# Patient Record
Sex: FEMALE | Race: WHITE | Employment: FULL TIME | URBAN - METROPOLITAN AREA
[De-identification: names, ages, dates, MRNs, and addresses within clinical notes are randomized per-mention and may not be internally consistent; named-entity substitution may affect disease eponyms.]

---

## 2018-05-17 ENCOUNTER — HOSPITAL ENCOUNTER (OUTPATIENT)
Age: 23
Discharge: HOME | End: 2018-05-19
Payer: COMMERCIAL

## 2018-05-17 PROCEDURE — 87070 CULTURE OTHR SPECIMN AEROBIC: CPT

## 2018-05-20 LAB — GENITAL CULTURE, ROUTINE: NORMAL

## 2018-07-19 ENCOUNTER — HOSPITAL ENCOUNTER (OUTPATIENT)
Age: 23
Discharge: HOME | End: 2018-07-21
Payer: COMMERCIAL

## 2018-07-19 DIAGNOSIS — N94.89 SWELLING OF LABIA: ICD-10-CM

## 2018-07-19 DIAGNOSIS — N89.8 VAGINAL DISCHARGE: ICD-10-CM

## 2018-07-19 DIAGNOSIS — N76.0 ACUTE VAGINITIS: ICD-10-CM

## 2018-07-19 DIAGNOSIS — N89.8 VAGINAL ODOR: ICD-10-CM

## 2018-07-19 DIAGNOSIS — R39.15 URGENCY OF URINATION: ICD-10-CM

## 2018-07-19 PROCEDURE — 87147 CULTURE TYPE IMMUNOLOGIC: CPT

## 2018-07-19 PROCEDURE — 87070 CULTURE OTHR SPECIMN AEROBIC: CPT

## 2018-07-23 LAB
GENITAL CULTURE, ROUTINE: ABNORMAL
GENITAL CULTURE, ROUTINE: ABNORMAL
ORGANISM: ABNORMAL
ORGANISM: ABNORMAL

## 2018-08-13 ENCOUNTER — HOSPITAL ENCOUNTER (EMERGENCY)
Age: 23
Discharge: HOME | End: 2018-08-13
Payer: COMMERCIAL

## 2018-08-13 VITALS
OXYGEN SATURATION: 98 % | BODY MASS INDEX: 41.65 KG/M2 | SYSTOLIC BLOOD PRESSURE: 136 MMHG | DIASTOLIC BLOOD PRESSURE: 76 MMHG | RESPIRATION RATE: 16 BRPM | WEIGHT: 250 LBS | HEIGHT: 65 IN | HEART RATE: 62 BPM | TEMPERATURE: 98.5 F

## 2018-08-13 DIAGNOSIS — B37.0 ORAL THRUSH: Primary | ICD-10-CM

## 2018-08-13 PROCEDURE — 99283 EMERGENCY DEPT VISIT LOW MDM: CPT

## 2018-08-13 ASSESSMENT — PAIN SCALES - GENERAL
PAINLEVEL_OUTOF10: 6
PAINLEVEL_OUTOF10: 6

## 2018-08-13 ASSESSMENT — PAIN DESCRIPTION - LOCATION
LOCATION: THROAT;MOUTH
LOCATION: MOUTH;THROAT

## 2018-08-13 ASSESSMENT — PAIN DESCRIPTION - PAIN TYPE
TYPE: ACUTE PAIN
TYPE: ACUTE PAIN

## 2018-08-13 ASSESSMENT — PAIN DESCRIPTION - DESCRIPTORS: DESCRIPTORS: CONSTANT

## 2018-08-13 NOTE — ED PROVIDER NOTES
[facility]       Department of Emergency Medicine   ED  Provider Note  Admit Date/RoomTime: 8/13/2018  2:26 PM  ED Room: 13/13  MRN: [id number]  Chief Complaint: Oral Swelling (for 2  days and throat feels full. chest tight at times with lifting)       History of Present Illness   Source of history provided by:  patient. History/Exam Limitations: none. Arabella Leung is a 24 y.o. female who has a past medical history of: No past medical history on file. presents to the ED by private car and is alone for white patches and discomfort to her tongue, beginning 2 days ago and are unchanged since that time. The complaint has been persistent, moderate in severity. She states she is also having painful swallowing. Patient states she just finished a course of flagyl for a bacterial vaginosis. She denies shortness of breath, nausea, or vomiting. She denies abdominal pain, vaginal discharge or bleeding. She denies dysuria or flank pain. ROS    Pertinent positives and negatives are stated within HPI, all other systems reviewed and are negative. No past surgical history on file. Social History:  reports that she has been smoking Cigarettes. She has never used smokeless tobacco. She reports that she does not drink alcohol or use drugs. Family History: family history is not on file. Allergies: Clindamycin/lincomycin    Physical Exam   Oxygen Saturation Interpretation: Normal.   ED Triage Vitals [08/13/18 1428]   BP Temp Temp src Pulse Resp SpO2 Height Weight   136/76 98.5 °F (36.9 °C) -- 62 16 98 % 5' 5\" (1.651 m) 250 lb (113.4 kg)       Physical Exam  · Constitutional/General: Alert and oriented x3, well appearing, non toxic  · HEENT:  NC/NT. PERRLA,  Airway patent. White patches noted to tongue. No erythema or white patches to throat.    · Neck: Supple, full ROM, non tender to palpation in the midline, no stridor, no crepitus, no meningeal signs  · Respiratory: Lungs clear to auscultation bilaterally, no wheezes, rales, or rhonchi. Not in respiratory distress  · CV:  Regular rate. Regular rhythm. No murmurs, gallops, or rubs. 2+ distal pulses  · Chest: No chest wall tenderness  · GI:  Abdomen Soft, Non tender, Non distended. +BS. No rebound, guarding, or rigidity. No pulsatile masses. · Musculoskeletal: Moves all extremities x 4. Warm and well perfused, no clubbing, cyanosis, or edema. Capillary refill <3 seconds  · Integument: skin warm and dry. No rashes. · Lymphatic: no lymphadenopathy noted  · Neurologic: GCS 15, no focal deficits  · Psychiatric: Normal Affect    Lab / Imaging Results   (All laboratory and radiology results have been personally reviewed by myself)  Labs:  No results found for this visit on 08/13/18. Imaging: All Radiology results interpreted by Radiologist unless otherwise noted. No orders to display       ED Course / Medical Decision Making   Medications - No data to display     Consult(s):   None    Procedure(s):   none    MDM:   Patient with white patches to tongue following antibiotic usage. No rash or other signs/symptoms of anaphylaxis. Patient discharged with nystatin and is instructed to contact primary care provider in 2-3 days. Patient to return sooner for new or worsening symptoms. Counseling: The emergency provider has spoken with the patient and discussed todays results, in addition to providing specific details for the plan of care and counseling regarding the diagnosis and prognosis. Questions are answered at this time and they are agreeable with the plan. Assessment      1. Oral thrush      Plan   Discharge to home  Patient condition is stable    New Medications     New Prescriptions    NYSTATIN (MYCOSTATIN) 885881 UNIT/ML SUSPENSION    Take 5 mLs by mouth 4 times daily Swish in mouth as long as possible before swallowing. Electronically signed by JO ANN Menon CNP   DD: 8/13/18  **This report was transcribed using voice recognition software. Every effort was made to ensure accuracy; however, inadvertent computerized transcription errors may be present.   END OF ED PROVIDER NOTE  ATTENDING PROVIDER ATTESTATION:     Supervising Physician, on-site, available for consultation, non-participatory in the evaluation or care of this patient     JO ANN Ly CNP  08/13/18 [id number] Michel Garretson, [place]  08/30/18 [id number]

## 2018-10-28 ENCOUNTER — HOSPITAL ENCOUNTER (EMERGENCY)
Age: 23
Discharge: HOME | End: 2018-10-28
Payer: COMMERCIAL

## 2018-10-28 VITALS
BODY MASS INDEX: 40.97 KG/M2 | DIASTOLIC BLOOD PRESSURE: 92 MMHG | OXYGEN SATURATION: 97 % | SYSTOLIC BLOOD PRESSURE: 156 MMHG | HEART RATE: 94 BPM | HEIGHT: 64 IN | WEIGHT: 240 LBS | TEMPERATURE: 98.9 F | RESPIRATION RATE: 17 BRPM

## 2018-10-28 DIAGNOSIS — J02.0 STREP PHARYNGITIS: Primary | ICD-10-CM

## 2018-10-28 LAB — STREP GRP A PCR: POSITIVE

## 2018-10-28 PROCEDURE — 99282 EMERGENCY DEPT VISIT SF MDM: CPT

## 2018-10-28 PROCEDURE — 87880 STREP A ASSAY W/OPTIC: CPT

## 2018-10-28 RX ORDER — CEFDINIR 300 MG/1
300 CAPSULE ORAL 2 TIMES DAILY
Qty: 20 CAPSULE | Refills: 0 | Status: SHIPPED | OUTPATIENT
Start: 2018-10-28 | End: 2018-11-07

## 2018-10-28 RX ORDER — NAPROXEN 500 MG/1
500 TABLET ORAL 2 TIMES DAILY
Qty: 30 TABLET | Refills: 0 | Status: SHIPPED | OUTPATIENT
Start: 2018-10-28 | End: 2019-03-23 | Stop reason: ALTCHOICE

## 2018-10-28 ASSESSMENT — PAIN DESCRIPTION - DESCRIPTORS: DESCRIPTORS: BURNING;SHARP

## 2018-10-28 ASSESSMENT — PAIN DESCRIPTION - LOCATION: LOCATION: THROAT

## 2018-10-28 ASSESSMENT — PAIN SCALES - GENERAL: PAINLEVEL_OUTOF10: 9

## 2018-10-28 ASSESSMENT — PAIN DESCRIPTION - FREQUENCY: FREQUENCY: CONTINUOUS

## 2018-10-28 ASSESSMENT — PAIN DESCRIPTION - PAIN TYPE: TYPE: ACUTE PAIN

## 2018-10-29 ENCOUNTER — HOSPITAL ENCOUNTER (OUTPATIENT)
Age: 23
Discharge: HOME | End: 2018-10-31
Payer: COMMERCIAL

## 2018-10-29 DIAGNOSIS — N94.10 DYSPAREUNIA, FEMALE: ICD-10-CM

## 2018-10-29 DIAGNOSIS — N89.8 VAGINAL IRRITATION: ICD-10-CM

## 2018-10-29 PROCEDURE — 87070 CULTURE OTHR SPECIMN AEROBIC: CPT

## 2018-10-29 PROCEDURE — 87147 CULTURE TYPE IMMUNOLOGIC: CPT

## 2018-10-29 PROCEDURE — 87210 SMEAR WET MOUNT SALINE/INK: CPT

## 2018-10-29 PROCEDURE — 87591 N.GONORRHOEAE DNA AMP PROB: CPT

## 2018-10-29 PROCEDURE — 87491 CHLMYD TRACH DNA AMP PROBE: CPT

## 2018-11-01 LAB
CHLAMYDIA TRACHOMATIS AMPLIFIED DET: NORMAL
N GONORRHOEAE AMPLIFIED DET: NORMAL

## 2018-11-02 LAB
GENITAL CULTURE, ROUTINE: ABNORMAL
GENITAL CULTURE, ROUTINE: ABNORMAL
ORGANISM: ABNORMAL

## 2018-11-03 LAB — CULTURE, TRICHOMONAS: NORMAL

## 2019-03-23 ENCOUNTER — HOSPITAL ENCOUNTER (EMERGENCY)
Age: 24
Discharge: HOME | End: 2019-03-23
Attending: EMERGENCY MEDICINE
Payer: COMMERCIAL

## 2019-03-23 VITALS
WEIGHT: 235 LBS | HEART RATE: 84 BPM | DIASTOLIC BLOOD PRESSURE: 88 MMHG | OXYGEN SATURATION: 98 % | TEMPERATURE: 98.3 F | SYSTOLIC BLOOD PRESSURE: 128 MMHG | RESPIRATION RATE: 16 BRPM | HEIGHT: 66 IN | BODY MASS INDEX: 37.77 KG/M2

## 2019-03-23 DIAGNOSIS — K52.9 GASTROENTERITIS: Primary | ICD-10-CM

## 2019-03-23 PROCEDURE — 96372 THER/PROPH/DIAG INJ SC/IM: CPT

## 2019-03-23 PROCEDURE — 6370000000 HC RX 637 (ALT 250 FOR IP): Performed by: EMERGENCY MEDICINE

## 2019-03-23 PROCEDURE — 6360000002 HC RX W HCPCS: Performed by: EMERGENCY MEDICINE

## 2019-03-23 PROCEDURE — 99283 EMERGENCY DEPT VISIT LOW MDM: CPT

## 2019-03-23 RX ORDER — PROMETHAZINE HYDROCHLORIDE 25 MG/ML
25 INJECTION, SOLUTION INTRAMUSCULAR; INTRAVENOUS ONCE
Status: COMPLETED | OUTPATIENT
Start: 2019-03-23 | End: 2019-03-23

## 2019-03-23 RX ORDER — ONDANSETRON 2 MG/ML
4 INJECTION INTRAMUSCULAR; INTRAVENOUS EVERY 6 HOURS PRN
Status: DISCONTINUED | OUTPATIENT
Start: 2019-03-23 | End: 2019-03-23 | Stop reason: HOSPADM

## 2019-03-23 RX ORDER — DIPHENOXYLATE HYDROCHLORIDE AND ATROPINE SULFATE 2.5; .025 MG/1; MG/1
1 TABLET ORAL ONCE
Status: COMPLETED | OUTPATIENT
Start: 2019-03-23 | End: 2019-03-23

## 2019-03-23 RX ORDER — ONDANSETRON 4 MG/1
4 TABLET, ORALLY DISINTEGRATING ORAL EVERY 8 HOURS PRN
Qty: 10 TABLET | Refills: 0 | Status: SHIPPED | OUTPATIENT
Start: 2019-03-23 | End: 2019-10-14 | Stop reason: ALTCHOICE

## 2019-03-23 RX ORDER — DIPHENOXYLATE HYDROCHLORIDE AND ATROPINE SULFATE 2.5; .025 MG/1; MG/1
1 TABLET ORAL 4 TIMES DAILY PRN
Qty: 20 TABLET | Refills: 0 | Status: SHIPPED | OUTPATIENT
Start: 2019-03-23 | End: 2019-03-28

## 2019-03-23 RX ADMIN — PROMETHAZINE HYDROCHLORIDE 25 MG: 25 INJECTION INTRAMUSCULAR; INTRAVENOUS at 06:23

## 2019-03-23 RX ADMIN — DIPHENOXYLATE HYDROCHLORIDE AND ATROPINE SULFATE 1 TABLET: 2.5; .025 TABLET ORAL at 06:23

## 2019-03-23 RX ADMIN — ONDANSETRON HYDROCHLORIDE 4 MG: 2 SOLUTION INTRAMUSCULAR; INTRAVENOUS at 06:22

## 2019-12-30 VITALS
OXYGEN SATURATION: 99 % | BODY MASS INDEX: 36.96 KG/M2 | HEART RATE: 83 BPM | SYSTOLIC BLOOD PRESSURE: 125 MMHG | TEMPERATURE: 98 F | WEIGHT: 230 LBS | RESPIRATION RATE: 16 BRPM | HEIGHT: 66 IN | DIASTOLIC BLOOD PRESSURE: 82 MMHG

## 2019-12-30 LAB
ALBUMIN SERPL-MCNC: 4.2 G/DL (ref 3.5–5.2)
ALP BLD-CCNC: 63 U/L (ref 35–104)
ALT SERPL-CCNC: 28 U/L (ref 0–32)
ANION GAP SERPL CALCULATED.3IONS-SCNC: 14 MMOL/L (ref 7–16)
AST SERPL-CCNC: 25 U/L (ref 0–31)
BACTERIA: ABNORMAL /HPF
BASOPHILS ABSOLUTE: 0.07 E9/L (ref 0–0.2)
BASOPHILS RELATIVE PERCENT: 0.5 % (ref 0–2)
BILIRUB SERPL-MCNC: 0.3 MG/DL (ref 0–1.2)
BILIRUBIN URINE: ABNORMAL
BLOOD, URINE: ABNORMAL
BUN BLDV-MCNC: 9 MG/DL (ref 6–20)
CALCIUM SERPL-MCNC: 9.5 MG/DL (ref 8.6–10.2)
CHLORIDE BLD-SCNC: 101 MMOL/L (ref 98–107)
CLARITY: ABNORMAL
CO2: 24 MMOL/L (ref 22–29)
COLOR: ABNORMAL
CREAT SERPL-MCNC: 0.8 MG/DL (ref 0.5–1)
EOSINOPHILS ABSOLUTE: 0.13 E9/L (ref 0.05–0.5)
EOSINOPHILS RELATIVE PERCENT: 0.9 % (ref 0–6)
EPITHELIAL CELLS, UA: ABNORMAL /HPF
GFR AFRICAN AMERICAN: >60
GFR NON-AFRICAN AMERICAN: >60 ML/MIN/1.73
GLUCOSE BLD-MCNC: 119 MG/DL (ref 74–99)
GLUCOSE URINE: NEGATIVE MG/DL
HCG, URINE, POC: NEGATIVE
HCT VFR BLD CALC: 42.6 % (ref 34–48)
HEMOGLOBIN: 13.7 G/DL (ref 11.5–15.5)
IMMATURE GRANULOCYTES #: 0.06 E9/L
IMMATURE GRANULOCYTES %: 0.4 % (ref 0–5)
KETONES, URINE: ABNORMAL MG/DL
LACTIC ACID, SEPSIS: 1.9 MMOL/L (ref 0.5–1.9)
LEUKOCYTE ESTERASE, URINE: NEGATIVE
LIPASE: 16 U/L (ref 13–60)
LYMPHOCYTES ABSOLUTE: 1.99 E9/L (ref 1.5–4)
LYMPHOCYTES RELATIVE PERCENT: 14 % (ref 20–42)
Lab: NORMAL
MCH RBC QN AUTO: 27.5 PG (ref 26–35)
MCHC RBC AUTO-ENTMCNC: 32.2 % (ref 32–34.5)
MCV RBC AUTO: 85.4 FL (ref 80–99.9)
MONOCYTES ABSOLUTE: 0.74 E9/L (ref 0.1–0.95)
MONOCYTES RELATIVE PERCENT: 5.2 % (ref 2–12)
NEGATIVE QC PASS/FAIL: NORMAL
NEUTROPHILS ABSOLUTE: 11.27 E9/L (ref 1.8–7.3)
NEUTROPHILS RELATIVE PERCENT: 79 % (ref 43–80)
NITRITE, URINE: POSITIVE
PDW BLD-RTO: 12.3 FL (ref 11.5–15)
PH UA: 5 (ref 5–9)
PLATELET # BLD: 278 E9/L (ref 130–450)
PMV BLD AUTO: 9.9 FL (ref 7–12)
POSITIVE QC PASS/FAIL: NORMAL
POTASSIUM SERPL-SCNC: 4.5 MMOL/L (ref 3.5–5)
PROTEIN UA: 100 MG/DL
RBC # BLD: 4.99 E12/L (ref 3.5–5.5)
RBC UA: >20 /HPF (ref 0–2)
SODIUM BLD-SCNC: 139 MMOL/L (ref 132–146)
SPECIFIC GRAVITY UA: >=1.03 (ref 1–1.03)
TOTAL PROTEIN: 8.1 G/DL (ref 6.4–8.3)
UROBILINOGEN, URINE: 1 E.U./DL
WBC # BLD: 14.3 E9/L (ref 4.5–11.5)
WBC UA: ABNORMAL /HPF (ref 0–5)

## 2019-12-30 PROCEDURE — 85025 COMPLETE CBC W/AUTO DIFF WBC: CPT

## 2019-12-30 PROCEDURE — 83605 ASSAY OF LACTIC ACID: CPT

## 2019-12-30 PROCEDURE — 81001 URINALYSIS AUTO W/SCOPE: CPT

## 2019-12-30 PROCEDURE — 83690 ASSAY OF LIPASE: CPT

## 2019-12-30 PROCEDURE — 80053 COMPREHEN METABOLIC PANEL: CPT

## 2019-12-30 PROCEDURE — 36415 COLL VENOUS BLD VENIPUNCTURE: CPT

## 2019-12-30 ASSESSMENT — PAIN DESCRIPTION - LOCATION: LOCATION: ABDOMEN;BACK

## 2019-12-30 ASSESSMENT — PAIN DESCRIPTION - PAIN TYPE: TYPE: ACUTE PAIN

## 2019-12-30 ASSESSMENT — PAIN DESCRIPTION - DESCRIPTORS: DESCRIPTORS: CRAMPING

## 2019-12-30 ASSESSMENT — PAIN DESCRIPTION - PROGRESSION: CLINICAL_PROGRESSION: NOT CHANGED

## 2019-12-30 ASSESSMENT — PAIN DESCRIPTION - FREQUENCY: FREQUENCY: INTERMITTENT

## 2019-12-31 ENCOUNTER — HOSPITAL ENCOUNTER (EMERGENCY)
Age: 24
Discharge: HOME | End: 2019-12-31

## 2019-12-31 PROCEDURE — 99283 EMERGENCY DEPT VISIT LOW MDM: CPT

## 2020-01-03 NOTE — ED NOTES
FIRST PROVIDER CONTACT ASSESSMENT NOTE      Department of Emergency Medicine   ED  First Provider Note   1/2/20  11:46 PM    Chief Complaint: Abdominal Pain (and flank pain past couple of hours. States she is on her period but this pain is 5x worse. Last BM saturday, with difficulty. Denies dysuria. )      History of Present Illness:    Day Harden is a 21 y.o. female who presents to the ED by private car for Abdominal Pain (and flank pain past couple of hours. States she is on her period but this pain is 5x worse. Last BM saturday, with difficulty. Denies dysuria. )  Focused Screening Exam:  Constitutional:  Alert, appears stated age and is in no distress.       *ALLERGIES*     Clindamycin/lincomycin     ED Triage Vitals   BP Temp Temp Source Pulse Resp SpO2 Height Weight   12/30/19 2148 12/30/19 2148 12/30/19 2148 12/30/19 2052 12/30/19 2148 12/30/19 2052 12/30/19 2148 12/30/19 2148   125/82 98 °F (36.7 °C) Oral 83 16 97 % 5' 6\" (1.676 m) 230 lb (104.3 kg)        Initial Plan of Care:  Initiate Treatment-Testing, Proceed toTreatment Area When Bed Available for ED Attending/MLP to Continue Care      -----------------END OF FIRST PROVIDER CONTACT ASSESSMENT NOTE--------------  Electronically signed by JO ANN Elena CNP   DD: 1/2/20         JO ANN Elena CNP  01/02/20 2346

## 2020-03-10 ENCOUNTER — HOSPITAL ENCOUNTER (EMERGENCY)
Age: 25
Discharge: HOME | End: 2020-03-10
Payer: MEDICAID

## 2020-03-10 VITALS
BODY MASS INDEX: 37.77 KG/M2 | OXYGEN SATURATION: 97 % | RESPIRATION RATE: 16 BRPM | DIASTOLIC BLOOD PRESSURE: 88 MMHG | WEIGHT: 235 LBS | HEIGHT: 66 IN | SYSTOLIC BLOOD PRESSURE: 142 MMHG | TEMPERATURE: 98.6 F | HEART RATE: 69 BPM

## 2020-03-10 LAB
INFLUENZA A BY PCR: NOT DETECTED
INFLUENZA B BY PCR: NOT DETECTED
STREP GRP A PCR: POSITIVE

## 2020-03-10 PROCEDURE — 6370000000 HC RX 637 (ALT 250 FOR IP): Performed by: NURSE PRACTITIONER

## 2020-03-10 PROCEDURE — 87502 INFLUENZA DNA AMP PROBE: CPT

## 2020-03-10 PROCEDURE — 87880 STREP A ASSAY W/OPTIC: CPT

## 2020-03-10 PROCEDURE — 99283 EMERGENCY DEPT VISIT LOW MDM: CPT

## 2020-03-10 RX ORDER — IBUPROFEN 800 MG/1
800 TABLET ORAL ONCE
Status: COMPLETED | OUTPATIENT
Start: 2020-03-10 | End: 2020-03-10

## 2020-03-10 RX ORDER — IBUPROFEN 800 MG/1
800 TABLET ORAL EVERY 8 HOURS PRN
Qty: 15 TABLET | Refills: 0 | Status: SHIPPED | OUTPATIENT
Start: 2020-03-10 | End: 2020-05-24

## 2020-03-10 RX ORDER — AMOXICILLIN 250 MG/1
500 CAPSULE ORAL ONCE
Status: COMPLETED | OUTPATIENT
Start: 2020-03-10 | End: 2020-03-10

## 2020-03-10 RX ORDER — AMOXICILLIN 500 MG/1
500 CAPSULE ORAL 2 TIMES DAILY
Qty: 20 CAPSULE | Refills: 0 | Status: SHIPPED | OUTPATIENT
Start: 2020-03-10 | End: 2020-03-20 | Stop reason: ALTCHOICE

## 2020-03-10 RX ADMIN — IBUPROFEN 800 MG: 800 TABLET, FILM COATED ORAL at 16:58

## 2020-03-10 RX ADMIN — AMOXICILLIN 500 MG: 250 CAPSULE ORAL at 16:58

## 2020-03-10 ASSESSMENT — PAIN SCALES - GENERAL
PAINLEVEL_OUTOF10: 8
PAINLEVEL_OUTOF10: 8

## 2020-03-10 ASSESSMENT — PAIN DESCRIPTION - DESCRIPTORS: DESCRIPTORS: SORE;SHARP

## 2020-03-10 ASSESSMENT — PAIN DESCRIPTION - PAIN TYPE: TYPE: ACUTE PAIN

## 2020-03-10 ASSESSMENT — PAIN DESCRIPTION - LOCATION: LOCATION: THROAT

## 2020-03-10 NOTE — ED PROVIDER NOTES
[facility]       Department of Emergency Medicine   ED  Provider Note  Admit Date/RoomTime: 3/10/2020  4:11 PM  ED Room: 09/09    Chief Complaint   Pharyngitis (day 3 and tongue feels swollen, hard to swallow, no chills or fever, no vomit, +headache)    History of Present Illness   Source of history provided by:  patient. History/Exam Limitations: none. Ismael Fay is a 21 y.o. old female who has a past medical history of: No past medical history on file. presents to the emergency department complaint of a sore throat, runny nose, sneezing and congestion for the past 2 days. Reports this morning she began to have a nonproductive cough. Denies fever, chills, neck pain/stiffness, dysphasia, nausea, vomiting, diarrhea, abdominal pain, back pain, dysuria, urinary frequency, chest pain, shortness of breath, lightheadedness, dizziness, syncope, or any other complaints at this time. Reports that she has been using Mucinex with only mild improvement in her symptoms. Since onset the symptoms have been persistent. She is drinking moderate amounts of fluids. Exposed To: Streptococcus: unknown. Infectious Mononucleosis:  unknown. Symptoms:  Pain:  Yes. Muffled Voice:  No.                            Hoarse:  No.                            Difficulty with Secretions:  No.    Centor Score (MODIFIED) For Strep Pharyngitis:    Age 3-14 Years   no (0)     Age >44 Years   NO     Exudate or Swelling on Tonsils   no (0)     Tender/Swollen Anterior Cervical Nodes   no (0)     Fever? (T > 38°C, 100.4°F)   no (0)     Absence of Cough   no (0)   TOTAL POINTS   0   Score of Zero = Probability of Strep Pharyngitis: 1% - 2.5%. ,   No further testing nor antibiotics. Score of 1 = Probability of Strep Pharyngitis: 5% - 10%. ,   No further testing nor antibiotics. Score of 2 = Probability of Strep Phar: 11% - 17%.    Culture/test all, ATB's only for positive culture results. Score of 3 = Probability of Strep Phar: 28% - 35%. Culture/test all, ATB's only for positive culture results  Score of 4 or 5 = Probability of Strep Pharyngitis: 51% - 53%. Treat empirically with antibiotics. ROS    Pertinent positives and negatives are stated within HPI, all other systems reviewed and are negative. Past Surgical History:  has no past surgical history on file. Social History:  reports that she has never smoked. She has never used smokeless tobacco. She reports current drug use. Drug: Marijuana. She reports that she does not drink alcohol. Family History: family history is not on file. Allergies: Clindamycin/lincomycin    Physical Exam           ED Triage Vitals   BP Temp Temp Source Pulse Resp SpO2 Height Weight   03/10/20 1558 03/10/20 1558 03/10/20 1558 03/10/20 1558 03/10/20 1558 03/10/20 1558 03/10/20 1615 03/10/20 1615   (!) 142/88 98.6 °F (37 °C) Oral 69 16 97 % 5' 6\" (1.676 m) 235 lb (106.6 kg)     Oxygen Saturation Interpretation: Normal.    Constitutional:  Alert, development consistent with age. .  Ears:  TMs without perforation, injection, or bulging. External canals clear without exudate. Throat: Airway Patent. moderate erythema. No exudate or hypertrophy       Neck/Lymphatic:  Neck Supple. There is no  preauricular, anterior cervical, posterior cervical and supraclavicular node tenderness. respiratory:  Clear to auscultation and breath sounds equal.    CV: Regular rate and rhythm, normal heart sounds, without pathological murmurs, ectopy, gallops, or rubs. GI:  Abdomen Soft, nontender, good bowel sounds. No firm or pulsatile mass. Inegument:  No rashes, erythema present. Neurological:  Oriented. Motor functions intact.     Lab / Imaging Results   (All laboratory and radiology results have been personally reviewed by myself)  Labs:  Results for orders placed or performed during the hospital encounter of 03/10/20   Strep Screen Group A Throat Result Value Ref Range    Strep Grp A PCR POSITIVE Negative   Rapid influenza A/B antigens   Result Value Ref Range    Influenza A by PCR Not Detected Not Detected    Influenza B by PCR Not Detected Not Detected     Imaging: All Radiology results interpreted by Radiologist unless otherwise noted. No orders to display       ED Course / Medical Decision Making     Medications   amoxicillin (AMOXIL) capsule 500 mg (500 mg Oral Given 3/10/20 1658)   ibuprofen (ADVIL;MOTRIN) tablet 800 mg (800 mg Oral Given 3/10/20 1658)        Consult(s):   None    Procedure(s):   none    MDM:   Based on moderate suspicion for Strep as per history/physical findings, Rapid Strep/Throat Culture testing was done and confirms the above findings  Pharyngitis is likely to  be Strep. Antibiotics are indicated at this time based on clinical presentation and physical findings. Started on Amoxicillin. Negative for Influenza A&B. No concerns for abscess. Tolerating PO without difficulty. Not hypoxic, nothing to suggest pneumonia. Patient is well appearing, non toxic and appropriate for outpatient management. Advised to return for any new, changing, worsening symptoms or concerns. Plan of Care: Normal progression of disease discussed. All questions answered. Instruction provided in the use of fluids, vaporizer, acetaminophen, and other OTC medication for symptom control. Extra fluids  Analgesics as needed, dose reviewed. Follow-up in 3 days, or sooner should symptoms worsen. At this time the patient is without objective evidence of an acute process requiring hospitalization or inpatient management. They have remained hemodynamically stable throughout their entire ED visit and are stable for discharge with outpatient follow-up. The plan has been discussed in detail and they are aware of the specific conditions for emergent return, as well as the importance of follow-up. Counseling:     The emergency provider has spoken with the

## 2020-05-24 ENCOUNTER — HOSPITAL ENCOUNTER (EMERGENCY)
Age: 25
Discharge: HOME | End: 2020-05-24
Attending: EMERGENCY MEDICINE
Payer: MEDICAID

## 2020-05-24 VITALS
RESPIRATION RATE: 16 BRPM | HEIGHT: 65 IN | WEIGHT: 145 LBS | TEMPERATURE: 98 F | DIASTOLIC BLOOD PRESSURE: 78 MMHG | SYSTOLIC BLOOD PRESSURE: 132 MMHG | BODY MASS INDEX: 24.16 KG/M2 | HEART RATE: 100 BPM | OXYGEN SATURATION: 98 %

## 2020-05-24 LAB — STREP GRP A PCR: POSITIVE

## 2020-05-24 PROCEDURE — 99282 EMERGENCY DEPT VISIT SF MDM: CPT

## 2020-05-24 PROCEDURE — 87880 STREP A ASSAY W/OPTIC: CPT

## 2020-05-24 PROCEDURE — 6370000000 HC RX 637 (ALT 250 FOR IP): Performed by: EMERGENCY MEDICINE

## 2020-05-24 RX ORDER — AMOXICILLIN AND CLAVULANATE POTASSIUM 875; 125 MG/1; MG/1
1 TABLET, FILM COATED ORAL 2 TIMES DAILY
Qty: 20 TABLET | Refills: 0 | Status: SHIPPED | OUTPATIENT
Start: 2020-05-24 | End: 2020-06-03

## 2020-05-24 RX ORDER — OXYCODONE HYDROCHLORIDE AND ACETAMINOPHEN 5; 325 MG/1; MG/1
1 TABLET ORAL ONCE
Status: COMPLETED | OUTPATIENT
Start: 2020-05-24 | End: 2020-05-24

## 2020-05-24 RX ORDER — IBUPROFEN 600 MG/1
600 TABLET ORAL EVERY 8 HOURS PRN
Qty: 15 TABLET | Refills: 0 | Status: SHIPPED | OUTPATIENT
Start: 2020-05-24 | End: 2020-12-04

## 2020-05-24 RX ORDER — IBUPROFEN 600 MG/1
600 TABLET ORAL ONCE
Status: COMPLETED | OUTPATIENT
Start: 2020-05-24 | End: 2020-05-24

## 2020-05-24 RX ORDER — AMOXICILLIN AND CLAVULANATE POTASSIUM 875; 125 MG/1; MG/1
1 TABLET, FILM COATED ORAL ONCE
Status: COMPLETED | OUTPATIENT
Start: 2020-05-24 | End: 2020-05-24

## 2020-05-24 RX ORDER — OXYCODONE HYDROCHLORIDE AND ACETAMINOPHEN 5; 325 MG/1; MG/1
1 TABLET ORAL EVERY 6 HOURS PRN
Qty: 8 TABLET | Refills: 0 | Status: SHIPPED | OUTPATIENT
Start: 2020-05-24 | End: 2020-05-27

## 2020-05-24 RX ADMIN — OXYCODONE HYDROCHLORIDE AND ACETAMINOPHEN 1 TABLET: 5; 325 TABLET ORAL at 02:18

## 2020-05-24 RX ADMIN — AMOXICILLIN AND CLAVULANATE POTASSIUM 1 TABLET: 875; 125 TABLET, FILM COATED ORAL at 02:18

## 2020-05-24 RX ADMIN — IBUPROFEN 600 MG: 600 TABLET, FILM COATED ORAL at 02:18

## 2020-05-24 ASSESSMENT — PAIN SCALES - GENERAL: PAINLEVEL_OUTOF10: 8

## 2020-05-24 ASSESSMENT — PAIN DESCRIPTION - DESCRIPTORS: DESCRIPTORS: DISCOMFORT

## 2020-05-24 ASSESSMENT — PAIN DESCRIPTION - PAIN TYPE: TYPE: ACUTE PAIN

## 2020-05-24 ASSESSMENT — PAIN DESCRIPTION - LOCATION: LOCATION: THROAT

## 2020-05-24 NOTE — ED NOTES
Instructions provided and questions answered. Prescriptions verified with patient. Stated her ride was out in the car.      Pancho Reed RN  05/24/20 [id number]

## 2020-05-24 NOTE — ED PROVIDER NOTES
HPI:  5/24/20, Time: 2:11 AM EDT        Paris Cloud is a 21 y.o. female presenting to the ED for severe sore throat pain, beginning 12 hours ago. The complaint has been persistent, severe in severity, and worsened by swallowing. No other complaints. Pain rated @ 8/10 severity. Review of Systems:   Pertinent positives and negatives are stated within HPI, all other systems reviewed and are negative.          --------------------------------------------- PAST HISTORY ---------------------------------------------  Past Medical History:  has no past medical history on file. Past Surgical History:  has no past surgical history on file. Social History:  reports that she has never smoked. She has never used smokeless tobacco. She reports previous drug use. Drug: Marijuana. She reports that she does not drink alcohol. Family History: family history is not on file. The patients home medications have been reviewed. Allergies: Clindamycin/lincomycin    -------------------------------------------------- RESULTS -------------------------------------------------  All laboratory and radiology results have been personally reviewed by myself   LABS:  Results for orders placed or performed during the hospital encounter of 05/24/20   Strep Screen Group A Throat   Result Value Ref Range    Strep Grp A PCR POSITIVE Negative       RADIOLOGY:  Interpreted by Radiologist.  No orders to display       ------------------------- NURSING NOTES AND VITALS REVIEWED ---------------------------   The nursing notes within the ED encounter and vital signs as below have been reviewed.    /78   Pulse 100   Temp 98 °F (36.7 °C) (Temporal)   Resp 16   Ht 5' 5\" (1.651 m)   Wt 145 lb (65.8 kg)   LMP 05/01/2020   SpO2 98%   BMI 24.13 kg/m²   Oxygen Saturation Interpretation: Normal      ---------------------------------------------------PHYSICAL EXAM--------------------------------------      Constitutional/General: Alert and oriented x3, well appearing, non toxic in moderate distress. Head: Normocephalic and atraumatic  Eyes: PERRL, EOMI  Mouth: Oropharynx clear, handling secretions, no trismus; + posterior pharyngeal erythema;  + mild exudates. No uvular shift. No signs of peritonsillar abscess. Neck: Supple, full ROM,   Pulmonary: Lungs clear to auscultation bilaterally, no wheezes, rales, or rhonchi. Not in respiratory distress  Cardiovascular:  Regular rate and rhythm, no murmurs, gallops, or rubs. 2+ distal pulses  Abdomen: Soft, non tender, non distended,   Extremities: Moves all extremities x 4. Warm and well perfused  Skin: warm and dry without rash  Neurologic: GCS 15, No meningeal signs. No acute process. Psych: Normal Affect    ------------------------------ ED COURSE/MEDICAL DECISION MAKING----------------------  Medications   amoxicillin-clavulanate (AUGMENTIN) 875-125 MG per tablet 1 tablet (1 tablet Oral Given 5/24/20 0218)   ibuprofen (ADVIL;MOTRIN) tablet 600 mg (600 mg Oral Given 5/24/20 0218)   oxyCODONE-acetaminophen (PERCOCET) 5-325 MG per tablet 1 tablet (1 tablet Oral Given 5/24/20 0218)         ED COURSE:     Medical Decision Making:   Differential Diagnoses:  Strep vs Viral Pharyngitis    Counseling: The emergency provider has spoken with the patient and discussed todays results, in addition to providing specific details for the plan of care and counseling regarding the diagnosis and prognosis. Questions are answered at this time and they are agreeable with the plan. Controlled Substance Monitoring:  Acute and Chronic Pain Monitoring:   RX Monitoring 5/24/2020   Periodic Controlled Substance Monitoring No signs of potential drug abuse or diversion identified.           --------------------------------- IMPRESSION AND DISPOSITION ---------------------------------    IMPRESSION  1. Streptococcal sore throat    2.  Sore throat        DISPOSITION  Disposition: Discharge to home  Patient

## 2020-05-26 ENCOUNTER — CARE COORDINATION (OUTPATIENT)
Dept: CARE COORDINATION | Age: 25
End: 2020-05-26

## 2020-09-23 ENCOUNTER — HOSPITAL ENCOUNTER (EMERGENCY)
Age: 25
Discharge: HOME | End: 2020-09-23
Payer: COMMERCIAL

## 2020-09-23 VITALS
OXYGEN SATURATION: 98 % | HEART RATE: 58 BPM | DIASTOLIC BLOOD PRESSURE: 82 MMHG | WEIGHT: 230 LBS | RESPIRATION RATE: 16 BRPM | SYSTOLIC BLOOD PRESSURE: 132 MMHG | HEIGHT: 66 IN | TEMPERATURE: 97.5 F | BODY MASS INDEX: 36.96 KG/M2

## 2020-09-23 LAB
BACTERIA: ABNORMAL /HPF
BILIRUBIN URINE: NEGATIVE
BLOOD, URINE: ABNORMAL
CLARITY: ABNORMAL
CLUE CELLS: NORMAL
COLOR: ABNORMAL
GLUCOSE URINE: NEGATIVE MG/DL
HCG(URINE) PREGNANCY TEST: NEGATIVE
KETONES, URINE: NEGATIVE MG/DL
LEUKOCYTE ESTERASE, URINE: NEGATIVE
NITRITE, URINE: NEGATIVE
PH UA: 5.5 (ref 5–9)
PROTEIN UA: 30 MG/DL
RBC UA: ABNORMAL /HPF (ref 0–2)
SOURCE WET PREP: NORMAL
SPECIFIC GRAVITY UA: >=1.03 (ref 1–1.03)
TRICHOMONAS PREP: NORMAL
UROBILINOGEN, URINE: 1 E.U./DL
WBC UA: ABNORMAL /HPF (ref 0–5)
YEAST WET PREP: NORMAL

## 2020-09-23 PROCEDURE — 99283 EMERGENCY DEPT VISIT LOW MDM: CPT

## 2020-09-23 PROCEDURE — 87491 CHLMYD TRACH DNA AMP PROBE: CPT

## 2020-09-23 PROCEDURE — 99284 EMERGENCY DEPT VISIT MOD MDM: CPT

## 2020-09-23 PROCEDURE — 81025 URINE PREGNANCY TEST: CPT

## 2020-09-23 PROCEDURE — 96372 THER/PROPH/DIAG INJ SC/IM: CPT

## 2020-09-23 PROCEDURE — 2500000003 HC RX 250 WO HCPCS: Performed by: NURSE PRACTITIONER

## 2020-09-23 PROCEDURE — 6360000002 HC RX W HCPCS: Performed by: NURSE PRACTITIONER

## 2020-09-23 PROCEDURE — 81001 URINALYSIS AUTO W/SCOPE: CPT

## 2020-09-23 PROCEDURE — 87591 N.GONORRHOEAE DNA AMP PROB: CPT

## 2020-09-23 PROCEDURE — 6370000000 HC RX 637 (ALT 250 FOR IP): Performed by: NURSE PRACTITIONER

## 2020-09-23 PROCEDURE — 87210 SMEAR WET MOUNT SALINE/INK: CPT

## 2020-09-23 RX ORDER — VALACYCLOVIR HYDROCHLORIDE 1 G/1
1000 TABLET, FILM COATED ORAL 2 TIMES DAILY
COMMUNITY
End: 2021-03-08 | Stop reason: SDUPTHER

## 2020-09-23 RX ORDER — ONDANSETRON 4 MG/1
4 TABLET, ORALLY DISINTEGRATING ORAL ONCE
Status: COMPLETED | OUTPATIENT
Start: 2020-09-23 | End: 2020-09-23

## 2020-09-23 RX ORDER — AZITHROMYCIN 250 MG/1
1000 TABLET, FILM COATED ORAL ONCE
Status: COMPLETED | OUTPATIENT
Start: 2020-09-23 | End: 2020-09-23

## 2020-09-23 RX ADMIN — LIDOCAINE HYDROCHLORIDE 250 MG: 10 INJECTION, SOLUTION EPIDURAL; INFILTRATION; INTRACAUDAL; PERINEURAL at 16:35

## 2020-09-23 RX ADMIN — AZITHROMYCIN 1000 MG: 250 TABLET, FILM COATED ORAL at 16:33

## 2020-09-23 RX ADMIN — ONDANSETRON 4 MG: 4 TABLET, ORALLY DISINTEGRATING ORAL at 16:34

## 2020-09-23 NOTE — ED NOTES
Discharged  To follow with pmd;  No adverse reaction after injection     Alejandra Guerrero RN  09/23/20 [id number]

## 2020-09-23 NOTE — ED PROVIDER NOTES
[facility]      Department of Emergency Medicine   ED  Provider Note  Admit Date/RoomTime: 2020  3:02 PM  ED Room:   MRN: [id number]  Chief Complaint       Exposure to STD (male partner diagnosed with gonorrhea.   ) and Vaginal Itching (c/o swelling and irratation)    History of Present Illness   Source of history provided by:  patient. History/Exam Limitations: none. Mark Barth is a 31 y.o. old female who has a past medical history of:   Past Medical History:   Diagnosis Date    Herpes     presents to the emergency departmen for complaint of exposure to gonorrhea. Patient states that she has a new partner and had unprotected sex 5 days ago and has noticed some irritation to her vaginal region. She reports that today partner called her and told her he tested positive for gonorrhea. She reports that she is presently on her menses and has been having some intermittent normal cramping. No complaint of abdominal pain. Denies fever, chills, back pain, dysuria, urinary frequency, arthralgias, myalgias, chest pain, shortness of breath, lightheadedness, dizziness, syncope, or any other complaints at this time. Since onset the symptoms have been intermittent. GYN History: having periods. STD History: history of gonorrhea. Patient's last menstrual period was 2020. Current Pregnancy: No.     Birth Control: None. Gravid Status:     . ROS   Pertinent positives and negatives are stated within HPI, all other systems reviewed and are negative. History reviewed. No pertinent surgical history. Social History:  reports that she has never smoked. She has never used smokeless tobacco. She reports previous drug use. Drug: Marijuana. She reports that she does not drink alcohol. Family History: family history is not on file.    Allergies: Clindamycin/lincomycin    Physical Exam           ED Triage Vitals   BP Temp Temp Source Pulse Resp SpO2 Height Weight   20 1444 20 1444 09/23/20 1444 09/23/20 1444 09/23/20 1444 09/23/20 1444 09/23/20 1453 09/23/20 1453   132/82 97.5 °F (36.4 °C) Infrared 58 16 98 % 5' 6\" (1.676 m) 230 lb (104.3 kg)      Oxygen Saturation Interpretation: Normal.    · General Appearance/Constitutional:  Alert, development consistent with age. · HEENT:  NC/NT. PERRLA. Airway patent. · Neck:  Supple. No lymphadenopathy. · Respiratory:  No retractions. Lungs Clear to auscultation and breath sounds equal.  · CV:  Regular rate and rhythm. · GI:  General Appearance: normal.         Bowel sounds: normal bowel sounds. Distension:  None. Tenderness: No abdominal tenderness. Liver: non-tender. Spleen:  non-tender. Pulsatile Mass: absent. Hernia:  no inguinal or femoral hernias noted. · Back: CVA Tenderness: No.  · : (chaperone present during examination). External Genitalia: General appearance; normal, Lesions absent. Urethral Meatus: Size normal, Location normal, Lesions absent, Prolapse absent. Vagina: blood small to moderate amount of vaginal bleeding. Patient reports being on her menses. No wounds or lesions. Cervix: normal appearing cervix without discharge or lesions and cervical motion tenderness absent. Uterus:  normal, mobile. Adenexa: no tenderness bilaterally. Anus/Perineum:  normal.  · Integument:  Normal turgor. Warm, dry, without visible rash, unless noted elsewhere. · Lymphatics: No edema, cap.refill <3sec. · Neurological:  Orientation age-appropriate. Motor functions intact.     Lab / Imaging Results   (All laboratory and radiology results have been personally reviewed by myself)  Labs:  Results for orders placed or performed during the hospital encounter of 09/23/20   C.trachomatis N.gonorrhoeae DNA    Specimen: Cervix   Result Value Ref Range    Source Vagina    Wet prep, genital    Specimen: Vaginal   Result Value Ref Range    Trichomonas Prep None Seen     Yeast, Wet Prep None Seen     Clue Cells, Wet Prep None Seen     Source Wet Prep VAGINAL    Urinalysis   Result Value Ref Range    Color, UA BROWN (A) Straw/Yellow    Clarity, UA SL CLOUDY Clear    Glucose, Ur Negative Negative mg/dL    Bilirubin Urine Negative Negative    Ketones, Urine Negative Negative mg/dL    Specific Gravity, UA >=1.030 1.005 - 1.030    Blood, Urine LARGE (A) Negative    pH, UA 5.5 5.0 - 9.0    Protein, UA 30 (A) Negative mg/dL    Urobilinogen, Urine 1.0 <2.0 E.U./dL    Nitrite, Urine Negative Negative    Leukocyte Esterase, Urine Negative Negative   Pregnancy, urine   Result Value Ref Range    HCG(Urine) Pregnancy Test NEGATIVE NEGATIVE   Microscopic Urinalysis   Result Value Ref Range    WBC, UA NONE 0 - 5 /HPF    RBC, UA 10-20 (A) 0 - 2 /HPF    Bacteria, UA FEW (A) None Seen /HPF     Imaging: All Radiology results interpreted by Radiologist unless otherwise noted. No orders to display     ED Course / Medical Decision Making     Medications   cefTRIAXone (ROCEPHIN) 250 mg in lidocaine 1 % 1 mL IM Injection (250 mg Intramuscular Given 9/23/20 1635)   azithromycin (ZITHROMAX) tablet 1,000 mg (1,000 mg Oral Given 9/23/20 1633)   ondansetron (ZOFRAN-ODT) disintegrating tablet 4 mg (4 mg Oral Given 9/23/20 1634)        Consults:   None    Procedures:   none    MDM:   Mingo Stover is a 69-year-old female who presented to the emergency department for complaint of exposure to STI. She reports that she had a new partner and he contacted her today and told her he was positive for gonorrhea. She reported last sexual encounter with him was 5 days ago. She reports some mild vaginal irritation. No abdominal pain. No back pain. No lesions or sores. Pelvic exam completed. Wet prep negative. GC and Chlamydia cultures pending. She was empirically treated with Rocephin and Zithromax. No CMT. No adnexal tenderness. Urine pregnancy was negative. Urinalysis negative for any concerns for UTI. She was presently on her menses. She remained hemodynamically stable, nontoxic, and appropriate for outpatient management. Safe sex practices were discussed and information provided. She was instructed to follow-up with her gynecologist.  She verbalized understanding. Advised to return for any new, change, worsening symptoms or concerns. At this time the patient is without objective evidence of an acute process requiring hospitalization or inpatient management. They have remained hemodynamically stable throughout their entire ED visit and are stable for discharge with outpatient follow-up. The plan has been discussed in detail and they are aware of the specific conditions for emergent return, as well as the importance of follow-up. Counseling: The emergency provider has spoken with the patient and discussed todays results, in addition to providing specific details for the plan of care and counseling regarding the diagnosis and prognosis. Questions are answered at this time and they are agreeable with the plan . Assessment      1. Exposure to STD      Plan   Discharge to home  Patient condition is good    New Medications     New Prescriptions    No medications on file     Electronically signed by JO ANN Colbert CNP   DD: 9/23/20  **This report was transcribed using voice recognition software. Every effort was made to ensure accuracy; however, inadvertent computerized transcription errors may be present.   END OF ED PROVIDER NOTE      JO ANN Colbert CNP  09/23/20 [id number]

## 2020-09-23 NOTE — ED NOTES
Pt states she has an appointment with ob-gyn in two weeks     Saleem Rodrigues, [address] [facility]  09/23/20 [phone]

## 2020-09-28 PROBLEM — I10 ESSENTIAL HYPERTENSION: Status: ACTIVE | Noted: 2020-09-28

## 2020-09-28 LAB
C TRACH DNA GENITAL QL NAA+PROBE: NEGATIVE
N. GONORRHOEAE DNA: ABNORMAL
SOURCE: ABNORMAL

## 2020-10-27 ENCOUNTER — HOSPITAL ENCOUNTER (OUTPATIENT)
Age: 25
Discharge: HOME | End: 2020-10-29
Payer: COMMERCIAL

## 2020-10-27 LAB
ALBUMIN SERPL-MCNC: 4.1 G/DL (ref 3.5–5.2)
ALP BLD-CCNC: 56 U/L (ref 35–104)
ALT SERPL-CCNC: 20 U/L (ref 0–32)
ANION GAP SERPL CALCULATED.3IONS-SCNC: 11 MMOL/L (ref 7–16)
AST SERPL-CCNC: 23 U/L (ref 0–31)
BASOPHILS ABSOLUTE: 0.05 E9/L (ref 0–0.2)
BASOPHILS RELATIVE PERCENT: 0.8 % (ref 0–2)
BILIRUB SERPL-MCNC: 0.2 MG/DL (ref 0–1.2)
BUN BLDV-MCNC: 9 MG/DL (ref 6–20)
CALCIUM SERPL-MCNC: 9.4 MG/DL (ref 8.6–10.2)
CHLORIDE BLD-SCNC: 104 MMOL/L (ref 98–107)
CHOLESTEROL, TOTAL: 161 MG/DL (ref 0–199)
CO2: 24 MMOL/L (ref 22–29)
CREAT SERPL-MCNC: 1 MG/DL (ref 0.5–1)
EOSINOPHILS ABSOLUTE: 0.28 E9/L (ref 0.05–0.5)
EOSINOPHILS RELATIVE PERCENT: 4.4 % (ref 0–6)
GFR AFRICAN AMERICAN: >60
GFR NON-AFRICAN AMERICAN: >60 ML/MIN/1.73
GLUCOSE BLD-MCNC: 87 MG/DL (ref 74–99)
HCT VFR BLD CALC: 41.7 % (ref 34–48)
HDLC SERPL-MCNC: 53 MG/DL
HEMOGLOBIN: 13.4 G/DL (ref 11.5–15.5)
IMMATURE GRANULOCYTES #: 0.03 E9/L
IMMATURE GRANULOCYTES %: 0.5 % (ref 0–5)
LDL CHOLESTEROL CALCULATED: 93 MG/DL (ref 0–99)
LYMPHOCYTES ABSOLUTE: 1.64 E9/L (ref 1.5–4)
LYMPHOCYTES RELATIVE PERCENT: 25.7 % (ref 20–42)
MCH RBC QN AUTO: 28.6 PG (ref 26–35)
MCHC RBC AUTO-ENTMCNC: 32.1 % (ref 32–34.5)
MCV RBC AUTO: 89.1 FL (ref 80–99.9)
MONOCYTES ABSOLUTE: 0.33 E9/L (ref 0.1–0.95)
MONOCYTES RELATIVE PERCENT: 5.2 % (ref 2–12)
NEUTROPHILS ABSOLUTE: 4.04 E9/L (ref 1.8–7.3)
NEUTROPHILS RELATIVE PERCENT: 63.4 % (ref 43–80)
PDW BLD-RTO: 13.2 FL (ref 11.5–15)
PLATELET # BLD: 268 E9/L (ref 130–450)
PMV BLD AUTO: 10.7 FL (ref 7–12)
POTASSIUM SERPL-SCNC: 4.6 MMOL/L (ref 3.5–5)
RBC # BLD: 4.68 E12/L (ref 3.5–5.5)
SODIUM BLD-SCNC: 139 MMOL/L (ref 132–146)
TOTAL PROTEIN: 7.2 G/DL (ref 6.4–8.3)
TRIGL SERPL-MCNC: 75 MG/DL (ref 0–149)
VLDLC SERPL CALC-MCNC: 15 MG/DL
WBC # BLD: 6.4 E9/L (ref 4.5–11.5)

## 2020-10-27 PROCEDURE — 85025 COMPLETE CBC W/AUTO DIFF WBC: CPT

## 2020-10-27 PROCEDURE — 80061 LIPID PANEL: CPT

## 2020-10-27 PROCEDURE — 80053 COMPREHEN METABOLIC PANEL: CPT

## 2020-12-04 PROBLEM — S76.011A HIP STRAIN, RIGHT, INITIAL ENCOUNTER: Status: ACTIVE | Noted: 2020-12-04

## 2020-12-31 PROBLEM — E28.2 PCOS (POLYCYSTIC OVARIAN SYNDROME): Status: ACTIVE | Noted: 2020-12-31

## 2021-03-04 ENCOUNTER — HOSPITAL ENCOUNTER (OUTPATIENT)
Age: 26
Discharge: HOME | End: 2021-03-06

## 2021-03-04 PROCEDURE — 88304 TISSUE EXAM BY PATHOLOGIST: CPT

## 2021-03-22 ENCOUNTER — HOSPITAL ENCOUNTER (EMERGENCY)
Age: 26
Discharge: HOME | End: 2021-03-22
Payer: COMMERCIAL

## 2021-03-22 VITALS
TEMPERATURE: 97.5 F | OXYGEN SATURATION: 98 % | DIASTOLIC BLOOD PRESSURE: 82 MMHG | HEIGHT: 66 IN | WEIGHT: 220 LBS | BODY MASS INDEX: 35.36 KG/M2 | HEART RATE: 77 BPM | SYSTOLIC BLOOD PRESSURE: 166 MMHG | RESPIRATION RATE: 16 BRPM

## 2021-03-22 DIAGNOSIS — Z20.2 POSSIBLE EXPOSURE TO STD: Primary | ICD-10-CM

## 2021-03-22 DIAGNOSIS — N30.00 ACUTE CYSTITIS WITHOUT HEMATURIA: ICD-10-CM

## 2021-03-22 LAB
BACTERIA: ABNORMAL /HPF
BILIRUBIN URINE: ABNORMAL
BLOOD, URINE: ABNORMAL
CLARITY: CLEAR
CLUE CELLS: NORMAL
COLOR: YELLOW
GLUCOSE URINE: NEGATIVE MG/DL
HCG(URINE) PREGNANCY TEST: NEGATIVE
KETONES, URINE: NEGATIVE MG/DL
LEUKOCYTE ESTERASE, URINE: ABNORMAL
NITRITE, URINE: NEGATIVE
PH UA: 5.5 (ref 5–9)
PROTEIN UA: 30 MG/DL
RBC UA: ABNORMAL /HPF (ref 0–2)
SOURCE WET PREP: NORMAL
SPECIFIC GRAVITY UA: >=1.03 (ref 1–1.03)
TRICHOMONAS PREP: NORMAL
UROBILINOGEN, URINE: 1 E.U./DL
WBC UA: ABNORMAL /HPF (ref 0–5)
YEAST WET PREP: NORMAL

## 2021-03-22 PROCEDURE — 96372 THER/PROPH/DIAG INJ SC/IM: CPT

## 2021-03-22 PROCEDURE — 99284 EMERGENCY DEPT VISIT MOD MDM: CPT

## 2021-03-22 PROCEDURE — 87491 CHLMYD TRACH DNA AMP PROBE: CPT

## 2021-03-22 PROCEDURE — 6360000002 HC RX W HCPCS: Performed by: NURSE PRACTITIONER

## 2021-03-22 PROCEDURE — 87210 SMEAR WET MOUNT SALINE/INK: CPT

## 2021-03-22 PROCEDURE — 6370000000 HC RX 637 (ALT 250 FOR IP): Performed by: NURSE PRACTITIONER

## 2021-03-22 PROCEDURE — 2500000003 HC RX 250 WO HCPCS: Performed by: NURSE PRACTITIONER

## 2021-03-22 PROCEDURE — 81025 URINE PREGNANCY TEST: CPT

## 2021-03-22 PROCEDURE — 87591 N.GONORRHOEAE DNA AMP PROB: CPT

## 2021-03-22 PROCEDURE — 81001 URINALYSIS AUTO W/SCOPE: CPT

## 2021-03-22 PROCEDURE — 87088 URINE BACTERIA CULTURE: CPT

## 2021-03-22 RX ORDER — ONDANSETRON 4 MG/1
4 TABLET, ORALLY DISINTEGRATING ORAL ONCE
Status: COMPLETED | OUTPATIENT
Start: 2021-03-22 | End: 2021-03-22

## 2021-03-22 RX ORDER — CEFDINIR 300 MG/1
300 CAPSULE ORAL 2 TIMES DAILY
Qty: 10 CAPSULE | Refills: 0 | Status: SHIPPED | OUTPATIENT
Start: 2021-03-22 | End: 2021-03-27

## 2021-03-22 RX ORDER — AZITHROMYCIN 250 MG/1
1000 TABLET, FILM COATED ORAL ONCE
Status: COMPLETED | OUTPATIENT
Start: 2021-03-22 | End: 2021-03-22

## 2021-03-22 RX ADMIN — ONDANSETRON 4 MG: 4 TABLET, ORALLY DISINTEGRATING ORAL at 12:58

## 2021-03-22 RX ADMIN — AZITHROMYCIN 1000 MG: 250 TABLET, FILM COATED ORAL at 12:57

## 2021-03-22 RX ADMIN — LIDOCAINE HYDROCHLORIDE 500 MG: 10 INJECTION, SOLUTION EPIDURAL; INFILTRATION; INTRACAUDAL; PERINEURAL at 13:00

## 2021-03-22 NOTE — ED PROVIDER NOTES
[facility]  Department of Emergency Medicine   ED  Encounter Note  Admit Date/RoomTime: 3/22/2021 11:59 AM  ED Room:     NAME: Christen Amezquita  : 1995  MRN: [id number]     Chief Complaint:  Exposure to STD (c/o pain, discharge, itching in vaginal area started Saturday 3/20/2021)    History of Present Illness       Christen Amezquita is a 31 y.o. old female who presents to the emergency department for complaint of vaginal discharge and concern for possible STD exposure. Patient reports that 2 days ago she began to have vaginal discharge which she described as yellow to green in color. Reports that she was initially having some vaginal irritation, which she thought was an outbreak of her genital herpes. She reported that she did start taking her antiviral medication but the discharge continued. Reports nausea without emesis. Denies fever, chills, abdominal pain, back pain, dysuria, urinary frequency, hematuria, arthralgias, myalgias, chest pain, shortness of breath, lightheadedness, dizziness, syncope, or any other complaints at this time. Reports that her period just start today. Since onset the symptoms have been unchanged and mild-moderate in severity. Symptoms are associated with nothing pertinent. She takes no blood thinning agents. Patient's last menstrual period was 2021 (exact date). ROS   Pertinent positives and negatives are stated within HPI, all other systems reviewed and are negative. Past Medical History:  has a past medical history of Essential hypertension and Herpes. Surgical History:  has a past surgical history that includes Tonsillectomy. Social History:  reports that she has never smoked. She has never used smokeless tobacco. She reports previous drug use. Drug: Marijuana. She reports that she does not drink alcohol. Family History: family history is not on file.      Allergies: Clindamycin/lincomycin    Physical Exam   Oxygen Saturation Interpretation: Normal.        ED Triage Vitals [03/22/21 1111]   BP Temp Temp Source Pulse Resp SpO2 Height Weight   (!) 142/102 97.5 °F (36.4 °C) Temporal 72 16 98 % -- --         General Appearance/Constitutional:  Alert, development consistent with age, NAD. HEENT:  NC/NT. PERRLA. Airway patent. Neck:  Supple. No lymphadenopathy. Respiratory:  Clear to auscultation and breath sounds equal.  CV:  Regular rate and rhythm. GI:  normal appearing, non-distended with no visible hernias. Bowel sounds: normal bowel sounds. Tenderness: No abdominal tenderness, guarding, rebound, rigidity or pulsatile mass. .        Liver: non-tender. Spleen:  non-tender. Back: CVA Tenderness: No.  : (chaperone present during examination). External Genitalia: General appearance; normal, Lesions absent. Urethral Meatus: Size normal, Location normal, Lesions absent, Prolapse absent. Vagina: blood small amount of dark vaginal blood noted. No clots. No lesions or wounds. Cervix: cervical motion tenderness absent. No cervical discharge. No lesions or wounds. Uterus:  normal, mobile. Adenexa: Masses absent, no tenderness bilaterally. Anus/Perineum:  normal.  Integument:  Normal turgor. Warm, dry, without visible rash, unless noted elsewhere. Lymphatics: No lymphangitis or adenopathy noted. Neurological:  Orientation age-appropriate. Motor functions intact.     Lab / Imaging Results   (All laboratory and radiology results have been personally reviewed by myself)  Labs:  Results for orders placed or performed during the hospital encounter of 03/22/21   C.trachomatis N.gonorrhoeae DNA    Specimen: Cervix   Result Value Ref Range    Source Vagina    Wet prep, genital    Specimen: Vaginal   Result Value Ref Range    Trichomonas Prep None Seen     Yeast, Wet Prep None Seen     Clue Cells, Wet Prep None Seen     Source Wet Prep VAGINAL Pregnancy, Urine   Result Value Ref Range    HCG(Urine) Pregnancy Test NEGATIVE NEGATIVE   Urinalysis, reflex to microscopic   Result Value Ref Range    Color, UA Yellow Straw/Yellow    Clarity, UA Clear Clear    Glucose, Ur Negative Negative mg/dL    Bilirubin Urine SMALL (A) Negative    Ketones, Urine Negative Negative mg/dL    Specific Gravity, UA >=1.030 1.005 - 1.030    Blood, Urine LARGE (A) Negative    pH, UA 5.5 5.0 - 9.0    Protein, UA 30 (A) Negative mg/dL    Urobilinogen, Urine 1.0 <2.0 E.U./dL    Nitrite, Urine Negative Negative    Leukocyte Esterase, Urine MODERATE (A) Negative   Microscopic Urinalysis   Result Value Ref Range    WBC, UA 10-20 (A) 0 - 5 /HPF    RBC, UA 10-20 (A) 0 - 2 /HPF    Bacteria, UA FEW (A) None Seen /HPF     Imaging: All Radiology results interpreted by Radiologist unless otherwise noted. No orders to display     ED Course / Medical Decision Making     Medications   cefTRIAXone (ROCEPHIN) 500 mg in lidocaine 1 % 1.43 mL IM Injection (500 mg Intramuscular Given 3/22/21 1300)   azithromycin (ZITHROMAX) tablet 1,000 mg (1,000 mg Oral Given 3/22/21 1257)   ondansetron (ZOFRAN-ODT) disintegrating tablet 4 mg (4 mg Oral Given 3/22/21 1258)        Consults:   None    Procedures:   none    MDM:   Nataly Dickson is a 26-year-old female who presented to the emergency department for complaint of concern for possible STD exposure. She reported that over the past couple days she has noticed vaginal discharge, which she stated was greenish-yellow in color. She reported that she just started her menstrual cycle yesterday. No fever or chills. No nausea or vomiting. No abdominal pain. No CVA tenderness. She had no urinary complaints. Abdomen was soft with normal bowel sounds. No tenderness on palpation. Urine pregnancy negative. Wet prep negative. On physical exam she had a small to moderate amount of vaginal bleeding. No other discharge was noted.   No CMT, no adnexal tenderness. No signs of concern for acute abdomen. Urinalysis was concerning for possible UTI versus possible contamination. There was moderate amount of leukocytes negative nitrates, 10-20 WBCs. She was prescribed Omnicef. Urine culture pending. She reported having an appointment with her gynecologist this week. She was instructed to follow-up as scheduled. GC chlamydia cultures pending. She was empirically treated with Rocephin and Zithromax. She remained hemodynamically stable, nontoxic, and appropriate for outpatient management. Advised to return for any new, change, worsening symptoms or concerns. At this time the patient is without objective evidence of an acute process requiring hospitalization or inpatient management. They have remained hemodynamically stable throughout their entire ED visit and are stable for discharge with outpatient follow-up. The plan has been discussed in detail and they are aware of the specific conditions for emergent return, as well as the importance of follow-up. Counseling:  I reviewed today's visit with the patient in addition to providing specific details for the plan of care and counseling regarding the diagnosis and prognosis. Questions are answered at this time and are agreeable with the plan. Assessment      1. Possible exposure to STD    2. Acute cystitis without hematuria      Plan   Discharge home. Patient condition is good    New Medications     New Prescriptions    CEFDINIR (OMNICEF) 300 MG CAPSULE    Take 1 capsule by mouth 2 times daily for 5 days     Electronically signed by JO ANN Amaro CNP   DD: 3/22/21  **This report was transcribed using voice recognition software. Every effort was made to ensure accuracy; however, inadvertent computerized transcription errors may be present.   END OF ED PROVIDER NOTE        JO ANN Amaro CNP  03/22/21 7011

## 2021-03-24 LAB — URINE CULTURE, ROUTINE: NORMAL

## 2021-03-25 LAB
C TRACH DNA GENITAL QL NAA+PROBE: NEGATIVE
N. GONORRHOEAE DNA: NEGATIVE
SOURCE: NORMAL

## 2023-03-15 ENCOUNTER — HOSPITAL ENCOUNTER (EMERGENCY)
Age: 28
Discharge: LEFT BEFORE DISCHARGE | End: 2023-03-15
Payer: MEDICAID

## 2023-03-15 ENCOUNTER — APPOINTMENT (OUTPATIENT)
Dept: GENERAL RADIOLOGY | Age: 28
End: 2023-03-15
Payer: MEDICAID

## 2023-03-15 VITALS
OXYGEN SATURATION: 98 % | WEIGHT: 210 LBS | RESPIRATION RATE: 16 BRPM | HEART RATE: 89 BPM | BODY MASS INDEX: 33.89 KG/M2 | DIASTOLIC BLOOD PRESSURE: 100 MMHG | SYSTOLIC BLOOD PRESSURE: 140 MMHG | TEMPERATURE: 98.9 F

## 2023-03-15 DIAGNOSIS — S90.122A CONTUSION OF SECOND TOE OF LEFT FOOT, INITIAL ENCOUNTER: Primary | ICD-10-CM

## 2023-03-15 PROCEDURE — 99283 EMERGENCY DEPT VISIT LOW MDM: CPT

## 2023-03-15 PROCEDURE — 6370000000 HC RX 637 (ALT 250 FOR IP): Performed by: PHYSICIAN ASSISTANT

## 2023-03-15 PROCEDURE — 73630 X-RAY EXAM OF FOOT: CPT

## 2023-03-15 RX ORDER — NAPROXEN 500 MG/1
500 TABLET ORAL 2 TIMES DAILY WITH MEALS
Qty: 10 TABLET | Refills: 0 | Status: SHIPPED | OUTPATIENT
Start: 2023-03-15 | End: 2023-03-20

## 2023-03-15 RX ORDER — NAPROXEN 500 MG/1
500 TABLET ORAL ONCE
Status: COMPLETED | OUTPATIENT
Start: 2023-03-15 | End: 2023-03-15

## 2023-03-15 RX ADMIN — NAPROXEN 500 MG: 500 TABLET ORAL at 16:39

## 2023-03-15 ASSESSMENT — PAIN DESCRIPTION - LOCATION
LOCATION: TOE (COMMENT WHICH ONE)
LOCATION: TOE (COMMENT WHICH ONE)

## 2023-03-15 ASSESSMENT — LIFESTYLE VARIABLES
HOW OFTEN DO YOU HAVE A DRINK CONTAINING ALCOHOL: NEVER
HOW MANY STANDARD DRINKS CONTAINING ALCOHOL DO YOU HAVE ON A TYPICAL DAY: PATIENT DOES NOT DRINK

## 2023-03-15 ASSESSMENT — PAIN DESCRIPTION - ONSET: ONSET: ON-GOING

## 2023-03-15 ASSESSMENT — PAIN SCALES - GENERAL
PAINLEVEL_OUTOF10: 10
PAINLEVEL_OUTOF10: 5

## 2023-03-15 ASSESSMENT — PAIN DESCRIPTION - ORIENTATION
ORIENTATION: LEFT
ORIENTATION: LEFT

## 2023-03-15 ASSESSMENT — PAIN DESCRIPTION - PAIN TYPE: TYPE: ACUTE PAIN

## 2023-03-15 ASSESSMENT — PAIN DESCRIPTION - DESCRIPTORS: DESCRIPTORS: DISCOMFORT;SORE

## 2023-03-15 ASSESSMENT — PAIN - FUNCTIONAL ASSESSMENT: PAIN_FUNCTIONAL_ASSESSMENT: 0-10

## 2023-03-15 NOTE — ED PROVIDER NOTES
Independent [facility]AK Visit. [address] [facility]  Department of Emergency Medicine   ED  Encounter Note  Admit Date/RoomTime: 3/15/2023  3:21 PM  [facility]    NAME: Prashant Remy  : 1995  MRN: [id number]     Chief Complaint:  Toe Injury (Left 2nd toe, stubbed yesterday)    History of Present Illness       Prashant Remy is a 28 y.o. old female presenting to the emergency department by private vehiclefor traumatic left, 2nd toe pain which occured 1 day(s) prior to arrival.  The complaint is due to kicking foot off of a wooden stool by accident while at home. Since onset the symptoms have been gradually worsening. Patient has no prior history of pain/injury with regards to today's visit. Her pain is aggraveated by movement and palpation and relieved by rest of injured area. Patient denies additional injuries including but not limited to head trauma or loss conscious. Patient Nu[place] fever chills nausea vomiting or diarrhea. Tetanus Status: unknown. ROS   Pertinent positives and negatives are stated within HPI, all other systems reviewed and are negative. Past Medical History:  has a past medical history of Essential hypertension and Herpes. Surgical History:  has a past surgical history that includes Tonsillectomy. Social History:  reports that she has never smoked. She has never used smokeless tobacco. She reports that she does not currently use drugs after having used the following drugs: Marijuana Aloma Jinny). She reports that she does not drink alcohol. Family History: family history is not on file.      Allergies: Clindamycin/lincomycin    Physical Exam   Oxygen Saturation Interpretation: Normal.        ED Triage Vitals   BP Temp Temp Source Heart Rate Resp SpO2 Height Weight   03/15/23 1455 03/15/23 1455 03/15/23 1455 03/15/23 1455 03/15/23 1455 03/15/23 1455 -- 03/15/23 1502   (!) 140/100 98.9 °F (37.2 °C) Oral 89 16 98 %  210 lb (95.3 kg)         Constitutional: Alert, development consistent with age. Neck:  Normal ROM. Supple. Physical Exam  Left Toe(s):  2nd toe distal phalanx            Tenderness: moderate. Swelling: None. Deformity: no deformity observed/palpated. ROM: diminished range with pain. Skin:  ecchymosis. No presence of subungual hematoma. No signs of infection. Neurovascular: Motor deficit: none. Sensory deficit: none. Pulse deficit: none. Capillary refill: normal.  Left Foot:             Tenderness:  none. Swelling: None. Deformity: no deformity observed/palpated. ROM: full range of motion. Skin:  no wounds, erythema, or swelling. Gait:  normal.  Lymphatics: No lymphangitis or adenopathy noted. Neurological:  Oriented. Motor functions intact. .    Lab / Imaging Results   (All laboratory and radiology results have been personally reviewed by myself)  Labs:  No results found for this visit on 03/15/23. Imaging: All Radiology results interpreted by Radiologist unless otherwise noted. XR FOOT LEFT (MIN 3 VIEWS)   Final Result      No acute findings in the left foot. ED Course / Medical Decision Making     Medications   naproxen (NAPROSYN) tablet 500 mg (500 mg Oral Given 3/15/23 1639)        Re-examination:  3/15/23       Time: 4:26 PM   Evaluated. Results discussed with patient as well as plan for discharged home with symptomatic care and follow-up to PCP. Consult(s):   None    Procedure(s):  None      Medical Decision Making    Patient presents to the ER for left, 2nd toe pain. Patient acts as her own historian. Social Determinants include   Social Connections: Not on file    Social Determinants : None. Chronic conditions    Past Medical History:   Diagnosis Date    Essential hypertension 9/28/2020    Herpes    .     Physical exam tenderness to palpation noted over the second digit distal phalanx with overlying ecchymosis without subungual hematoma present, as noted above. Vital signs within normal limits prior to discharge to home. Differential diagnoses include but not limited to distal phalanx fracture, contusion, cellulitis, etc. Diagnostic studies revealed findings as read by radiology. Consults included none. Results were discussed with patient prior to disposition all questions were answered. Patient was given naproxen 500 mg tablet by mouth for their symptoms with good improvement. Patient will be discharged home with the following prescriptions, naproxen 500 mg tablet take 1 tab by mouth 2 times daily for 5 days. Discussed appropriate use and potential side effects of starting the prescribed medications. Patient continues to be non-toxic on re-evaluation. Patient is alert and oriented, in no acute distress, afebrile, nontachycardic and nonhypoxic. Patient is recommended to utilize RICE in the outpatient setting. Findings were discussed with the patient and reasons to immediately return to the ED were articulated to them. They will follow-up with their PMD and return to ER with new or worsening symptoms. Patient understandable and agreeable to plan. Discussed the results as well as plan for retaking of the patient's blood pressure with symptomatic treatment in the outpatient setting and follow-up to her PCP patient eloped prior to blood pressure recheck. Discharge Instructions:   Patient referred to  [facility],   [address]  [phone]    Schedule an appointment as soon as possible for a visit       MEDICATIONS:   DISCHARGE MEDICATIONS:  New Prescriptions    NAPROXEN (NAPROSYN) 500 MG TABLET    Take 1 tablet by mouth 2 times daily (with meals) for 5 days       DISCONTINUED MEDICATIONS:  Discontinued Medications    No medications on file       Record Review:  Records Reviewed : None       Disposition Considerations:   This patient's ED course included: a personal history and physicial examination and re-evaluation prior to disposition  This patient has improved and been closely monitored during their ED course. I emphasized the importance of follow-up with the physician I referred them to in the timeframe recommended. I discussed with the patient emergent symptoms and the need to immediately return to the ER. Written information was included in their discharge instructions. Additional verbal discharge instructions were also given and discussed with the patient to supplement those generated by the EMR. We also discussed medications that were prescribed  (if any) including common side effects and interactions. The patient was advised to abstain from driving, operating heavy machinery or making significant decisions while taking medications such as opiates and muscle relaxers that may impair this. All questions were addressed. They understand return precautions and discharge instructions. The patient  expressed understanding. Vitals were stable and they were in no distress at discharge. Plan of Care/Counseling:  FELICITA Nunez reviewed today's visit with the patient in addition to providing specific details for the plan of care and counseling regarding the diagnosis and prognosis. Questions are answered at this time and are agreeable with the plan. Assessment      1. Contusion of second toe of left foot, initial encounter      Plan   Patient eloped prior to completion of treatment. Patient condition is good    New Medications     New Prescriptions    NAPROXEN (NAPROSYN) 500 MG TABLET    Take 1 tablet by mouth 2 times daily (with meals) for 5 days     Electronically signed by FELICITA Nunez   DD: 3/15/23  **This report was transcribed using voice recognition software. Every effort was made to ensure accuracy; however, inadvertent computerized transcription errors may be present.   END OF ED PROVIDER NOTE      Shabnam Martin FELICITA Brown  03/15/23 [address] [place], [place]  03/15/23 [facility] [place], [place]  03/15/23 0411

## 2023-07-28 PROBLEM — B00.9 HSV INFECTION: Status: ACTIVE | Noted: 2023-07-28

## 2023-07-28 PROBLEM — O10.919 CHRONIC HYPERTENSION AFFECTING PREGNANCY: Status: ACTIVE | Noted: 2023-07-28

## 2023-08-07 ENCOUNTER — APPOINTMENT (OUTPATIENT)
Dept: ULTRASOUND IMAGING | Age: 28
End: 2023-08-07
Payer: COMMERCIAL

## 2023-08-07 ENCOUNTER — HOSPITAL ENCOUNTER (EMERGENCY)
Age: 28
Discharge: HOME | End: 2023-08-07
Payer: COMMERCIAL

## 2023-08-07 VITALS
TEMPERATURE: 98.4 F | HEART RATE: 65 BPM | DIASTOLIC BLOOD PRESSURE: 80 MMHG | BODY MASS INDEX: 36.96 KG/M2 | WEIGHT: 230 LBS | RESPIRATION RATE: 16 BRPM | HEIGHT: 66 IN | SYSTOLIC BLOOD PRESSURE: 135 MMHG | OXYGEN SATURATION: 99 %

## 2023-08-07 DIAGNOSIS — O23.41 URINARY TRACT INFECTION DURING PREGNANCY IN FIRST TRIMESTER: Primary | ICD-10-CM

## 2023-08-07 LAB
ALBUMIN SERPL-MCNC: 4.1 G/DL (ref 3.5–5.2)
ALP SERPL-CCNC: 56 U/L (ref 35–104)
ALT SERPL-CCNC: 13 U/L (ref 0–32)
ANION GAP SERPL CALCULATED.3IONS-SCNC: 10 MMOL/L (ref 7–16)
AST SERPL-CCNC: 15 U/L (ref 0–31)
BACTERIA URNS QL MICRO: ABNORMAL
BILIRUB DIRECT SERPL-MCNC: <0.2 MG/DL (ref 0–0.3)
BILIRUB INDIRECT SERPL-MCNC: NORMAL MG/DL (ref 0–1)
BILIRUB SERPL-MCNC: 0.4 MG/DL (ref 0–1.2)
BILIRUB UR QL STRIP: NEGATIVE
BUN SERPL-MCNC: 7 MG/DL (ref 6–20)
CALCIUM SERPL-MCNC: 9.1 MG/DL (ref 8.6–10.2)
CHLORIDE SERPL-SCNC: 105 MMOL/L (ref 98–107)
CLARITY UR: ABNORMAL
CO2 SERPL-SCNC: 23 MMOL/L (ref 22–29)
COLOR UR: YELLOW
CREAT SERPL-MCNC: 0.6 MG/DL (ref 0.5–1)
ERYTHROCYTE [DISTWIDTH] IN BLOOD BY AUTOMATED COUNT: 13.2 % (ref 11.5–15)
GFR SERPL CREATININE-BSD FRML MDRD: >60 ML/MIN/1.73M2
GLUCOSE SERPL-MCNC: 114 MG/DL (ref 74–99)
GLUCOSE UR STRIP-MCNC: NEGATIVE MG/DL
HCT VFR BLD AUTO: 35.8 % (ref 34–48)
HGB BLD-MCNC: 12.5 G/DL (ref 11.5–15.5)
HGB UR QL STRIP.AUTO: ABNORMAL
KETONES UR STRIP-MCNC: ABNORMAL MG/DL
LACTATE BLDV-SCNC: 1.6 MMOL/L (ref 0.5–2.2)
LEUKOCYTE ESTERASE UR QL STRIP: ABNORMAL
LIPASE SERPL-CCNC: 17 U/L (ref 13–60)
MCH RBC QN AUTO: 29.1 PG (ref 26–35)
MCHC RBC AUTO-ENTMCNC: 34.9 G/DL (ref 32–34.5)
MCV RBC AUTO: 83.4 FL (ref 80–99.9)
NITRITE UR QL STRIP: POSITIVE
PH UR STRIP: 6 [PH] (ref 5–9)
PLATELET # BLD AUTO: 227 K/UL (ref 130–450)
PMV BLD AUTO: 9.3 FL (ref 7–12)
POTASSIUM SERPL-SCNC: 4.1 MMOL/L (ref 3.5–5)
PROT SERPL-MCNC: 7.3 G/DL (ref 6.4–8.3)
PROT UR STRIP-MCNC: 100 MG/DL
RBC # BLD AUTO: 4.29 M/UL (ref 3.5–5.5)
RBC #/AREA URNS HPF: ABNORMAL /HPF
SODIUM SERPL-SCNC: 138 MMOL/L (ref 132–146)
SP GR UR STRIP: >1.03 (ref 1–1.03)
UROBILINOGEN UR STRIP-ACNC: 0.2 EU/DL (ref 0–1)
WBC #/AREA URNS HPF: ABNORMAL /HPF
WBC OTHER # BLD: 11.4 K/UL (ref 4.5–11.5)

## 2023-08-07 PROCEDURE — 83605 ASSAY OF LACTIC ACID: CPT

## 2023-08-07 PROCEDURE — 83690 ASSAY OF LIPASE: CPT

## 2023-08-07 PROCEDURE — 96375 TX/PRO/DX INJ NEW DRUG ADDON: CPT

## 2023-08-07 PROCEDURE — 85027 COMPLETE CBC AUTOMATED: CPT

## 2023-08-07 PROCEDURE — 87086 URINE CULTURE/COLONY COUNT: CPT

## 2023-08-07 PROCEDURE — 99284 EMERGENCY DEPT VISIT MOD MDM: CPT

## 2023-08-07 PROCEDURE — 76801 OB US < 14 WKS SINGLE FETUS: CPT

## 2023-08-07 PROCEDURE — 82248 BILIRUBIN DIRECT: CPT

## 2023-08-07 PROCEDURE — 87088 URINE BACTERIA CULTURE: CPT

## 2023-08-07 PROCEDURE — 80053 COMPREHEN METABOLIC PANEL: CPT

## 2023-08-07 PROCEDURE — 81001 URINALYSIS AUTO W/SCOPE: CPT

## 2023-08-07 PROCEDURE — 2580000003 HC RX 258: Performed by: NURSE PRACTITIONER

## 2023-08-07 PROCEDURE — 96374 THER/PROPH/DIAG INJ IV PUSH: CPT

## 2023-08-07 PROCEDURE — 6360000002 HC RX W HCPCS: Performed by: NURSE PRACTITIONER

## 2023-08-07 RX ORDER — METOCLOPRAMIDE HYDROCHLORIDE 5 MG/ML
10 INJECTION INTRAMUSCULAR; INTRAVENOUS ONCE
Status: COMPLETED | OUTPATIENT
Start: 2023-08-07 | End: 2023-08-07

## 2023-08-07 RX ORDER — 0.9 % SODIUM CHLORIDE 0.9 %
1500 INTRAVENOUS SOLUTION INTRAVENOUS ONCE
Status: COMPLETED | OUTPATIENT
Start: 2023-08-07 | End: 2023-08-07

## 2023-08-07 RX ORDER — CEPHALEXIN 500 MG/1
500 CAPSULE ORAL 4 TIMES DAILY
Qty: 28 CAPSULE | Refills: 0 | Status: SHIPPED | OUTPATIENT
Start: 2023-08-07 | End: 2023-08-14

## 2023-08-07 RX ADMIN — METOCLOPRAMIDE 10 MG: 5 INJECTION, SOLUTION INTRAMUSCULAR; INTRAVENOUS at 14:44

## 2023-08-07 RX ADMIN — SODIUM CHLORIDE 1500 ML: 9 INJECTION, SOLUTION INTRAVENOUS at 14:40

## 2023-08-07 RX ADMIN — WATER 1000 MG: 1 INJECTION INTRAMUSCULAR; INTRAVENOUS; SUBCUTANEOUS at 16:10

## 2023-08-07 ASSESSMENT — PAIN - FUNCTIONAL ASSESSMENT: PAIN_FUNCTIONAL_ASSESSMENT: 0-10

## 2023-08-07 ASSESSMENT — PAIN DESCRIPTION - LOCATION: LOCATION: ABDOMEN

## 2023-08-07 ASSESSMENT — PAIN DESCRIPTION - ONSET: ONSET: SUDDEN

## 2023-08-07 ASSESSMENT — PAIN SCALES - GENERAL: PAINLEVEL_OUTOF10: 8

## 2023-08-07 ASSESSMENT — PAIN DESCRIPTION - FREQUENCY: FREQUENCY: CONTINUOUS

## 2023-08-07 ASSESSMENT — PAIN DESCRIPTION - DESCRIPTORS: DESCRIPTORS: CRAMPING

## 2023-08-07 ASSESSMENT — PAIN DESCRIPTION - PAIN TYPE: TYPE: ACUTE PAIN

## 2023-08-07 NOTE — ED PROVIDER NOTES
Independent ISAK Visit. [address] ENCOUNTER        Pt Name: Ryan Guerra  MRN: [id number]  [facility] 1995  Date of evaluation: 8/7/2023  Provider: JO ANN Frederick CNP  PCP: Teofilo Mario DO  Note Started: 7:11 PM EDT 8/7/23    CHIEF COMPLAINT       Chief Complaint   Patient presents with    Abdominal Pain     Abdominal cramping started an hour ago. Pt is 11 weeks pregnant. Passed some clots on Saturday. +Frequency with urination and is \"uncomfortable. \"       HISTORY OF PRESENT ILLNESS: 1 or more Elements   History From: Patient        Ryan Guerra is a 33 y.o. female who presents to the emergency department today for evaluation of suprapubic discomfort with cramping, hematuria, and urinary frequency. Patient reports that symptoms started 3 days ago. Patient verbalizes concern for urinary tract infection. Symptoms have been intermittent, mild in severity, worsened by nothing, relieved by nothing. Patient reports she is 11 weeks pregnant. Her OB/GYN is Dr. Hi Medina. Patient denies any vaginal bleeding, vaginal discharge, genital lesions, nausea, vomiting, diarrhea, back or flank pain, chest pain, shortness of breath. Denies any leakage of fluids. she is tolerating p.o. fluids and foods. Patient is a G1, P0. She saw Dr. Hi Medina on 7/27/2023, had an ultrasound completed as well as prenatal cultures and labs. She states that she was found to have a subchorionic hemorrhage which was small and they were just going to monitor this. Patient has no other reported complaints currently. Nursing Notes were all reviewed and agreed with or any disagreements were addressed in the HPI. REVIEW OF EXTERNAL NOTE :       OB/GYN office note from Dr. Hi Medina 7/27/2023 reviewed    REVIEW OF SYSTEMS :           Positives and Pertinent negatives as per HPI.      SURGICAL HISTORY     Past Surgical History:   Procedure

## 2023-08-10 LAB
MICROORGANISM SPEC CULT: ABNORMAL
SPECIMEN DESCRIPTION: ABNORMAL

## 2024-02-12 ENCOUNTER — APPOINTMENT (OUTPATIENT)
Dept: LABOR AND DELIVERY | Age: 29
End: 2024-02-12
Payer: COMMERCIAL

## 2024-02-12 ENCOUNTER — HOSPITAL ENCOUNTER (INPATIENT)
Age: 29
LOS: 4 days | Discharge: HOME | End: 2024-02-16
Attending: OBSTETRICS & GYNECOLOGY | Admitting: OBSTETRICS & GYNECOLOGY
Payer: COMMERCIAL

## 2024-02-12 DIAGNOSIS — G89.18 POST-OP PAIN: Primary | ICD-10-CM

## 2024-02-12 PROBLEM — Z3A.38 38 WEEKS GESTATION OF PREGNANCY: Status: ACTIVE | Noted: 2024-02-12

## 2024-02-12 LAB
ABO + RH BLD: NORMAL
AMPHET UR QL SCN: NEGATIVE
ARM BAND NUMBER: NORMAL
BARBITURATES UR QL SCN: NEGATIVE
BASOPHILS # BLD: 0.03 K/UL (ref 0–0.2)
BASOPHILS NFR BLD: 0 % (ref 0–2)
BENZODIAZ UR QL: NEGATIVE
BLOOD BANK SAMPLE EXPIRATION: NORMAL
BLOOD GROUP ANTIBODIES SERPL: NEGATIVE
BUPRENORPHINE UR QL: NEGATIVE
CANNABINOIDS UR QL SCN: POSITIVE
COCAINE UR QL SCN: NEGATIVE
EOSINOPHIL # BLD: 0.07 K/UL (ref 0.05–0.5)
EOSINOPHILS RELATIVE PERCENT: 1 % (ref 0–6)
ERYTHROCYTE [DISTWIDTH] IN BLOOD BY AUTOMATED COUNT: 13.8 % (ref 11.5–15)
FENTANYL UR QL: NEGATIVE
HCT VFR BLD AUTO: 34.7 % (ref 34–48)
HGB BLD-MCNC: 11.8 G/DL (ref 11.5–15.5)
IMM GRANULOCYTES # BLD AUTO: 0.05 K/UL (ref 0–0.58)
IMM GRANULOCYTES NFR BLD: 1 % (ref 0–5)
LYMPHOCYTES NFR BLD: 2.07 K/UL (ref 1.5–4)
LYMPHOCYTES RELATIVE PERCENT: 24 % (ref 20–42)
MCH RBC QN AUTO: 29 PG (ref 26–35)
MCHC RBC AUTO-ENTMCNC: 34 G/DL (ref 32–34.5)
MCV RBC AUTO: 85.3 FL (ref 80–99.9)
METHADONE UR QL: NEGATIVE
MONOCYTES NFR BLD: 0.71 K/UL (ref 0.1–0.95)
MONOCYTES NFR BLD: 8 % (ref 2–12)
NEUTROPHILS NFR BLD: 67 % (ref 43–80)
NEUTS SEG NFR BLD: 5.86 K/UL (ref 1.8–7.3)
OPIATES UR QL SCN: NEGATIVE
OXYCODONE UR QL SCN: NEGATIVE
PCP UR QL SCN: NEGATIVE
PLATELET # BLD AUTO: 160 K/UL (ref 130–450)
PMV BLD AUTO: 10.7 FL (ref 7–12)
RBC # BLD AUTO: 4.07 M/UL (ref 3.5–5.5)
TEST INFORMATION: ABNORMAL
WBC OTHER # BLD: 8.8 K/UL (ref 4.5–11.5)

## 2024-02-12 PROCEDURE — 80053 COMPREHEN METABOLIC PANEL: CPT

## 2024-02-12 PROCEDURE — 86850 RBC ANTIBODY SCREEN: CPT

## 2024-02-12 PROCEDURE — 2580000003 HC RX 258: Performed by: OBSTETRICS & GYNECOLOGY

## 2024-02-12 PROCEDURE — 86901 BLOOD TYPING SEROLOGIC RH(D): CPT

## 2024-02-12 PROCEDURE — 84550 ASSAY OF BLOOD/URIC ACID: CPT

## 2024-02-12 PROCEDURE — 1220000001 HC SEMI PRIVATE L&D R&B

## 2024-02-12 PROCEDURE — 85025 COMPLETE CBC W/AUTO DIFF WBC: CPT

## 2024-02-12 PROCEDURE — 6370000000 HC RX 637 (ALT 250 FOR IP): Performed by: OBSTETRICS & GYNECOLOGY

## 2024-02-12 PROCEDURE — 86900 BLOOD TYPING SEROLOGIC ABO: CPT

## 2024-02-12 PROCEDURE — 80307 DRUG TEST PRSMV CHEM ANLYZR: CPT

## 2024-02-12 RX ORDER — SODIUM CHLORIDE, SODIUM LACTATE, POTASSIUM CHLORIDE, CALCIUM CHLORIDE 600; 310; 30; 20 MG/100ML; MG/100ML; MG/100ML; MG/100ML
INJECTION, SOLUTION INTRAVENOUS CONTINUOUS
Status: DISCONTINUED | OUTPATIENT
Start: 2024-02-12 | End: 2024-02-14

## 2024-02-12 RX ORDER — SODIUM CHLORIDE, SODIUM LACTATE, POTASSIUM CHLORIDE, AND CALCIUM CHLORIDE .6; .31; .03; .02 G/100ML; G/100ML; G/100ML; G/100ML
1000 INJECTION, SOLUTION INTRAVENOUS PRN
Status: DISCONTINUED | OUTPATIENT
Start: 2024-02-12 | End: 2024-02-14

## 2024-02-12 RX ORDER — SODIUM CHLORIDE, SODIUM LACTATE, POTASSIUM CHLORIDE, AND CALCIUM CHLORIDE .6; .31; .03; .02 G/100ML; G/100ML; G/100ML; G/100ML
500 INJECTION, SOLUTION INTRAVENOUS PRN
Status: DISCONTINUED | OUTPATIENT
Start: 2024-02-12 | End: 2024-02-14

## 2024-02-12 RX ADMIN — SODIUM CHLORIDE, POTASSIUM CHLORIDE, SODIUM LACTATE AND CALCIUM CHLORIDE: 600; 310; 30; 20 INJECTION, SOLUTION INTRAVENOUS at 22:50

## 2024-02-12 RX ADMIN — Medication 25 MCG: at 23:30

## 2024-02-13 ENCOUNTER — ANESTHESIA (OUTPATIENT)
Dept: LABOR AND DELIVERY | Age: 29
End: 2024-02-13
Payer: COMMERCIAL

## 2024-02-13 ENCOUNTER — ANESTHESIA EVENT (OUTPATIENT)
Dept: LABOR AND DELIVERY | Age: 29
End: 2024-02-13
Payer: COMMERCIAL

## 2024-02-13 LAB
ALBUMIN SERPL-MCNC: 3.5 G/DL (ref 3.5–5.2)
ALP SERPL-CCNC: 95 U/L (ref 35–104)
ALT SERPL-CCNC: 25 U/L (ref 0–32)
AMNISURE, POC: POSITIVE
ANION GAP SERPL CALCULATED.3IONS-SCNC: 12 MMOL/L (ref 7–16)
AST SERPL-CCNC: 29 U/L (ref 0–31)
BILIRUB SERPL-MCNC: 0.3 MG/DL (ref 0–1.2)
BUN SERPL-MCNC: 6 MG/DL (ref 6–20)
CALCIUM SERPL-MCNC: 8.6 MG/DL (ref 8.6–10.2)
CHLORIDE SERPL-SCNC: 102 MMOL/L (ref 98–107)
CO2 SERPL-SCNC: 20 MMOL/L (ref 22–29)
CREAT SERPL-MCNC: 0.6 MG/DL (ref 0.5–1)
GFR SERPL CREATININE-BSD FRML MDRD: >60 ML/MIN/1.73M2
GLUCOSE SERPL-MCNC: 88 MG/DL (ref 74–99)
Lab: NORMAL
NEGATIVE QC PASS/FAIL: NORMAL
POSITIVE QC PASS/FAIL: NORMAL
POTASSIUM SERPL-SCNC: 3.7 MMOL/L (ref 3.5–5)
PROT SERPL-MCNC: 6.3 G/DL (ref 6.4–8.3)
SODIUM SERPL-SCNC: 134 MMOL/L (ref 132–146)
URATE SERPL-MCNC: 4.6 MG/DL (ref 2.4–5.7)

## 2024-02-13 PROCEDURE — 1220000001 HC SEMI PRIVATE L&D R&B

## 2024-02-13 PROCEDURE — 3700000025 EPIDURAL BLOCK: Performed by: ANESTHESIOLOGY

## 2024-02-13 PROCEDURE — 6360000002 HC RX W HCPCS

## 2024-02-13 PROCEDURE — APPNB30 APP NON BILLABLE TIME 0-30 MINS: Performed by: ADVANCED PRACTICE MIDWIFE

## 2024-02-13 PROCEDURE — 6360000002 HC RX W HCPCS: Performed by: OBSTETRICS & GYNECOLOGY

## 2024-02-13 PROCEDURE — 3E0P7VZ INTRODUCTION OF HORMONE INTO FEMALE REPRODUCTIVE, VIA NATURAL OR ARTIFICIAL OPENING: ICD-10-PCS | Performed by: OBSTETRICS & GYNECOLOGY

## 2024-02-13 PROCEDURE — 84112 EVAL AMNIOTIC FLUID PROTEIN: CPT

## 2024-02-13 PROCEDURE — 51701 INSERT BLADDER CATHETER: CPT

## 2024-02-13 PROCEDURE — 2500000003 HC RX 250 WO HCPCS: Performed by: ANESTHESIOLOGY

## 2024-02-13 PROCEDURE — 6370000000 HC RX 637 (ALT 250 FOR IP): Performed by: OBSTETRICS & GYNECOLOGY

## 2024-02-13 RX ORDER — NALOXONE HYDROCHLORIDE 0.4 MG/ML
INJECTION, SOLUTION INTRAMUSCULAR; INTRAVENOUS; SUBCUTANEOUS PRN
Status: DISCONTINUED | OUTPATIENT
Start: 2024-02-13 | End: 2024-02-14 | Stop reason: HOSPADM

## 2024-02-13 RX ORDER — ONDANSETRON 2 MG/ML
4 INJECTION INTRAMUSCULAR; INTRAVENOUS EVERY 6 HOURS PRN
Status: DISCONTINUED | OUTPATIENT
Start: 2024-02-13 | End: 2024-02-15

## 2024-02-13 RX ORDER — ONDANSETRON 2 MG/ML
INJECTION INTRAMUSCULAR; INTRAVENOUS
Status: COMPLETED
Start: 2024-02-13 | End: 2024-02-13

## 2024-02-13 RX ADMIN — Medication 25 MCG: at 03:40

## 2024-02-13 RX ADMIN — Medication 25 MCG: at 08:01

## 2024-02-13 RX ADMIN — Medication 1 MILLI-UNITS/MIN: at 13:14

## 2024-02-13 RX ADMIN — ONDANSETRON 4 MG: 2 INJECTION INTRAMUSCULAR; INTRAVENOUS at 20:07

## 2024-02-13 RX ADMIN — Medication 7 ML: at 21:55

## 2024-02-13 RX ADMIN — Medication 15 ML/HR: at 21:58

## 2024-02-13 RX ADMIN — BUTORPHANOL TARTRATE 2 MG: 2 INJECTION, SOLUTION INTRAMUSCULAR; INTRAVENOUS at 15:13

## 2024-02-13 RX ADMIN — BUTORPHANOL TARTRATE 2 MG: 2 INJECTION, SOLUTION INTRAMUSCULAR; INTRAVENOUS at 20:11

## 2024-02-13 ASSESSMENT — PAIN - FUNCTIONAL ASSESSMENT: PAIN_FUNCTIONAL_ASSESSMENT: ACTIVITIES ARE NOT PREVENTED

## 2024-02-13 ASSESSMENT — PAIN SCALES - GENERAL
PAINLEVEL_OUTOF10: 9
PAINLEVEL_OUTOF10: 6

## 2024-02-13 ASSESSMENT — PAIN DESCRIPTION - LOCATION: LOCATION: ABDOMEN

## 2024-02-13 ASSESSMENT — PAIN DESCRIPTION - DESCRIPTORS: DESCRIPTORS: CRAMPING

## 2024-02-13 NOTE — PROGRESS NOTES
Updated Dr. Hendrickosn SROM and sve 1cm/60/-3. Patient had 2 spontaneous decelerations lasting 2.5 minutes at 1142 and 1155. Resolved with reposition change. Accelerations and moderate variability present now. New orders to start pitocin in 30 min

## 2024-02-13 NOTE — ANESTHESIA PRE PROCEDURE
results found for: \"PROTIME\", \"INR\", \"APTT\"    HCG (If Applicable):   Lab Results   Component Value Date    PREGTESTUR positive 07/27/2023        ABGs: No results found for: \"PHART\", \"PO2ART\", \"BBX6ZFV\", \"PZR6NBU\", \"BEART\", \"B1VSNLXY\"     Type & Screen (If Applicable):  No results found for: \"LABABO\", \"LABRH\"    Drug/Infectious Status (If Applicable):  No results found for: \"HIV\", \"HEPCAB\"    COVID-19 Screening (If Applicable): No results found for: \"COVID19\"        Anesthesia Evaluation  Patient summary reviewed and Nursing notes reviewed   no history of anesthetic complications:   Airway: Mallampati: I  TM distance: >3 FB   Neck ROM: full  Mouth opening: > = 3 FB   Dental: normal exam         Pulmonary:   (+)       decreased breath sounds: bilateral    current smoker (former tobacco, current +MJ)    (-) asthma                           Cardiovascular:  Exercise tolerance: good (>4 METS)  (+) hypertension (chronic):        Rhythm: regular  Rate: normal           Beta Blocker:  Dose within 24 Hrs (labetalol)         Neuro/Psych:   (+) neuromuscular disease:, headaches: migraine headaches            GI/Hepatic/Renal:   (+) morbid obesity          Endo/Other:              Pt had no PAT visit        ROS comment: Hx Herpes, chlamydia, Gonorrhea Abdominal:   (+) obese          Vascular: negative vascular ROS.         Other Findings: pregnant            Anesthesia Plan      epidural, spinal and general     ASA 3     (Discussed epidural, spinal, and general anesthesia with patient. All questions answered. Patient would like to proceed with epidural.  )  Induction: intravenous.    MIPS: Postoperative opioids intended and Prophylactic antiemetics administered.  Anesthetic plan and risks discussed with patient.    Use of blood products discussed with patient whom consented to blood products.    Plan discussed with attending and CRNA.              Chioma Steen RN   2/13/2024    DOS STAFF ADDENDUM:    Chart reviewed on DOS.

## 2024-02-13 NOTE — PROGRESS NOTES
Called and notified Dr. Hendrickson of patient arrival, SVE and ctx pattern, admission orders received for cytotec every 4 hours, stadol, and to add a CMP and uric acid to admission lab work.

## 2024-02-13 NOTE — PROGRESS NOTES
Patient accepted prayer for herself and unborn baby. Patient was appreciative of  support and received a prayer card.

## 2024-02-13 NOTE — PROGRESS NOTES
, approx 38.2 weeks, presents to unit for scheduled induction for chronic HTN. Denies ctx, lof or vb, states positive fetal movement, placed on efm.

## 2024-02-13 NOTE — H&P
CHIEF COMPLAINT:  here for iol today, +fm no lof vb or pre-e sx, some ctx. Being induced for chronic htn    HISTORY OF PRESENT ILLNESS:      The patient is a 28 y.o. female at 38w3d.  OB History          1    Para   0    Term   0       0    AB   0    Living   0         SAB   0    IAB   0    Ectopic   0    Molar        Multiple   0    Live Births                Patient presents with a chief complaint as above and is being admitted for induction    Estimated Due Date: Estimated Date of Delivery: 24    PRENATAL CARE:    Complicated by: elevated b/p with chronic htn, hx std, mj use    PAST OB HISTORY  OB History          1    Para   0    Term   0       0    AB   0    Living   0         SAB   0    IAB   0    Ectopic   0    Molar        Multiple   0    Live Births                    Past Medical History:        Diagnosis Date    Chlamydia     Essential hypertension 2020    Gonorrhea     Herpes     Migraine      Past Surgical History:        Procedure Laterality Date    TONSILLECTOMY       Allergies:  Clindamycin/lincomycin  Social History:    Social History     Socioeconomic History    Marital status: Single     Spouse name: Not on file    Number of children: Not on file    Years of education: Not on file    Highest education level: Not on file   Occupational History    Not on file   Tobacco Use    Smoking status: Former     Types: Cigarettes    Smokeless tobacco: Never   Vaping Use    Vaping Use: Former   Substance and Sexual Activity    Alcohol use: No     Comment: occ    Drug use: Not Currently     Types: Marijuana (Weed)     Comment: occ    Sexual activity: Yes     Partners: Male   Other Topics Concern    Not on file   Social History Narrative    Not on file     Social Determinants of Health     Financial Resource Strain: Low Risk  (2021)    Overall Financial Resource Strain (CARDIA)     Difficulty of Paying Living Expenses: Not hard at all   Food Insecurity: No Food

## 2024-02-14 PROCEDURE — 1220000000 HC SEMI PRIVATE OB R&B

## 2024-02-14 PROCEDURE — 6360000002 HC RX W HCPCS: Performed by: OBSTETRICS & GYNECOLOGY

## 2024-02-14 PROCEDURE — 2500000003 HC RX 250 WO HCPCS: Performed by: OBSTETRICS & GYNECOLOGY

## 2024-02-14 PROCEDURE — 6370000000 HC RX 637 (ALT 250 FOR IP): Performed by: OBSTETRICS & GYNECOLOGY

## 2024-02-14 PROCEDURE — 3700000001 HC ADD 15 MINUTES (ANESTHESIA): Performed by: OBSTETRICS & GYNECOLOGY

## 2024-02-14 PROCEDURE — 7100000000 HC PACU RECOVERY - FIRST 15 MIN: Performed by: OBSTETRICS & GYNECOLOGY

## 2024-02-14 PROCEDURE — 6360000002 HC RX W HCPCS: Performed by: ANESTHESIOLOGY

## 2024-02-14 PROCEDURE — A4216 STERILE WATER/SALINE, 10 ML: HCPCS | Performed by: OBSTETRICS & GYNECOLOGY

## 2024-02-14 PROCEDURE — 2500000003 HC RX 250 WO HCPCS: Performed by: NURSE ANESTHETIST, CERTIFIED REGISTERED

## 2024-02-14 PROCEDURE — 2580000003 HC RX 258: Performed by: OBSTETRICS & GYNECOLOGY

## 2024-02-14 PROCEDURE — 2580000003 HC RX 258: Performed by: NURSE ANESTHETIST, CERTIFIED REGISTERED

## 2024-02-14 PROCEDURE — 3700000000 HC ANESTHESIA ATTENDED CARE: Performed by: OBSTETRICS & GYNECOLOGY

## 2024-02-14 PROCEDURE — 2500000003 HC RX 250 WO HCPCS: Performed by: ANESTHESIOLOGY

## 2024-02-14 PROCEDURE — 6360000002 HC RX W HCPCS: Performed by: NURSE ANESTHETIST, CERTIFIED REGISTERED

## 2024-02-14 PROCEDURE — 2709999900 HC NON-CHARGEABLE SUPPLY: Performed by: OBSTETRICS & GYNECOLOGY

## 2024-02-14 PROCEDURE — 3609079900 HC CESAREAN SECTION: Performed by: OBSTETRICS & GYNECOLOGY

## 2024-02-14 PROCEDURE — 7100000001 HC PACU RECOVERY - ADDTL 15 MIN: Performed by: OBSTETRICS & GYNECOLOGY

## 2024-02-14 PROCEDURE — 88307 TISSUE EXAM BY PATHOLOGIST: CPT

## 2024-02-14 RX ORDER — KETOROLAC TROMETHAMINE 30 MG/ML
30 INJECTION, SOLUTION INTRAMUSCULAR; INTRAVENOUS EVERY 6 HOURS PRN
Status: DISPENSED | OUTPATIENT
Start: 2024-02-14 | End: 2024-02-15

## 2024-02-14 RX ORDER — ACETAMINOPHEN 500 MG
1000 TABLET ORAL ONCE
Status: CANCELLED | OUTPATIENT
Start: 2024-02-14 | End: 2024-02-14

## 2024-02-14 RX ORDER — CARBOPROST TROMETHAMINE 250 UG/ML
250 INJECTION, SOLUTION INTRAMUSCULAR PRN
Status: DISCONTINUED | OUTPATIENT
Start: 2024-02-14 | End: 2024-02-14

## 2024-02-14 RX ORDER — LABETALOL 100 MG/1
50 TABLET, FILM COATED ORAL 2 TIMES DAILY
Status: DISCONTINUED | OUTPATIENT
Start: 2024-02-14 | End: 2024-02-16 | Stop reason: HOSPADM

## 2024-02-14 RX ORDER — ONDANSETRON 2 MG/ML
4 INJECTION INTRAMUSCULAR; INTRAVENOUS EVERY 6 HOURS PRN
Status: DISCONTINUED | OUTPATIENT
Start: 2024-02-14 | End: 2024-02-16 | Stop reason: HOSPADM

## 2024-02-14 RX ORDER — MORPHINE SULFATE 1 MG/ML
INJECTION, SOLUTION EPIDURAL; INTRATHECAL; INTRAVENOUS PRN
Status: DISCONTINUED | OUTPATIENT
Start: 2024-02-14 | End: 2024-02-14 | Stop reason: SDUPTHER

## 2024-02-14 RX ORDER — DOCUSATE SODIUM 100 MG/1
100 CAPSULE, LIQUID FILLED ORAL 2 TIMES DAILY
Status: DISCONTINUED | OUTPATIENT
Start: 2024-02-14 | End: 2024-02-16 | Stop reason: HOSPADM

## 2024-02-14 RX ORDER — DIPHENHYDRAMINE HYDROCHLORIDE 50 MG/ML
25 INJECTION INTRAMUSCULAR; INTRAVENOUS EVERY 6 HOURS PRN
Status: ACTIVE | OUTPATIENT
Start: 2024-02-14 | End: 2024-02-15

## 2024-02-14 RX ORDER — NALOXONE HYDROCHLORIDE 0.4 MG/ML
INJECTION, SOLUTION INTRAMUSCULAR; INTRAVENOUS; SUBCUTANEOUS PRN
Status: ACTIVE | OUTPATIENT
Start: 2024-02-14 | End: 2024-02-15

## 2024-02-14 RX ORDER — OXYCODONE HYDROCHLORIDE 5 MG/1
10 TABLET ORAL EVERY 4 HOURS PRN
Status: DISPENSED | OUTPATIENT
Start: 2024-02-14 | End: 2024-02-15

## 2024-02-14 RX ORDER — METOCLOPRAMIDE HYDROCHLORIDE 5 MG/ML
10 INJECTION INTRAMUSCULAR; INTRAVENOUS ONCE
Status: CANCELLED | OUTPATIENT
Start: 2024-02-14 | End: 2024-02-14

## 2024-02-14 RX ORDER — MODIFIED LANOLIN
OINTMENT (GRAM) TOPICAL
Status: DISCONTINUED | OUTPATIENT
Start: 2024-02-14 | End: 2024-02-16 | Stop reason: HOSPADM

## 2024-02-14 RX ORDER — LIDOCAINE HYDROCHLORIDE 20 MG/ML
INJECTION, SOLUTION EPIDURAL; INFILTRATION; INTRACAUDAL; PERINEURAL PRN
Status: DISCONTINUED | OUTPATIENT
Start: 2024-02-14 | End: 2024-02-14 | Stop reason: SDUPTHER

## 2024-02-14 RX ORDER — SIMETHICONE 80 MG
80 TABLET,CHEWABLE ORAL EVERY 6 HOURS PRN
Status: DISCONTINUED | OUTPATIENT
Start: 2024-02-14 | End: 2024-02-16 | Stop reason: HOSPADM

## 2024-02-14 RX ORDER — ONDANSETRON 2 MG/ML
INJECTION INTRAMUSCULAR; INTRAVENOUS PRN
Status: DISCONTINUED | OUTPATIENT
Start: 2024-02-14 | End: 2024-02-14 | Stop reason: SDUPTHER

## 2024-02-14 RX ORDER — METOCLOPRAMIDE HYDROCHLORIDE 5 MG/ML
10 INJECTION INTRAMUSCULAR; INTRAVENOUS ONCE
Status: COMPLETED | OUTPATIENT
Start: 2024-02-14 | End: 2024-02-14

## 2024-02-14 RX ORDER — SODIUM CHLORIDE, SODIUM LACTATE, POTASSIUM CHLORIDE, AND CALCIUM CHLORIDE .6; .31; .03; .02 G/100ML; G/100ML; G/100ML; G/100ML
1000 INJECTION, SOLUTION INTRAVENOUS ONCE
Status: DISCONTINUED | OUTPATIENT
Start: 2024-02-14 | End: 2024-02-14

## 2024-02-14 RX ORDER — SODIUM CHLORIDE 0.9 % (FLUSH) 0.9 %
5-40 SYRINGE (ML) INJECTION PRN
Status: DISCONTINUED | OUTPATIENT
Start: 2024-02-14 | End: 2024-02-16 | Stop reason: HOSPADM

## 2024-02-14 RX ORDER — DIPHENHYDRAMINE HCL 25 MG
25 TABLET ORAL EVERY 6 HOURS PRN
Status: ACTIVE | OUTPATIENT
Start: 2024-02-14 | End: 2024-02-15

## 2024-02-14 RX ORDER — FENTANYL CITRATE 50 UG/ML
INJECTION, SOLUTION INTRAMUSCULAR; INTRAVENOUS PRN
Status: DISCONTINUED | OUTPATIENT
Start: 2024-02-14 | End: 2024-02-14 | Stop reason: SDUPTHER

## 2024-02-14 RX ORDER — OXYCODONE HYDROCHLORIDE 5 MG/1
5 TABLET ORAL EVERY 4 HOURS PRN
Status: ACTIVE | OUTPATIENT
Start: 2024-02-14 | End: 2024-02-15

## 2024-02-14 RX ORDER — SODIUM CHLORIDE, SODIUM LACTATE, POTASSIUM CHLORIDE, CALCIUM CHLORIDE 600; 310; 30; 20 MG/100ML; MG/100ML; MG/100ML; MG/100ML
INJECTION, SOLUTION INTRAVENOUS CONTINUOUS PRN
Status: DISCONTINUED | OUTPATIENT
Start: 2024-02-14 | End: 2024-02-14 | Stop reason: SDUPTHER

## 2024-02-14 RX ORDER — SODIUM CHLORIDE 0.9 % (FLUSH) 0.9 %
5-40 SYRINGE (ML) INJECTION EVERY 12 HOURS SCHEDULED
Status: DISCONTINUED | OUTPATIENT
Start: 2024-02-14 | End: 2024-02-16 | Stop reason: HOSPADM

## 2024-02-14 RX ORDER — ONDANSETRON 2 MG/ML
4 INJECTION INTRAMUSCULAR; INTRAVENOUS EVERY 6 HOURS PRN
Status: DISCONTINUED | OUTPATIENT
Start: 2024-02-14 | End: 2024-02-15

## 2024-02-14 RX ORDER — BISACODYL 10 MG
10 SUPPOSITORY, RECTAL RECTAL DAILY PRN
Status: DISCONTINUED | OUTPATIENT
Start: 2024-02-14 | End: 2024-02-16 | Stop reason: HOSPADM

## 2024-02-14 RX ORDER — MISOPROSTOL 200 UG/1
800 TABLET ORAL PRN
Status: DISCONTINUED | OUTPATIENT
Start: 2024-02-14 | End: 2024-02-14

## 2024-02-14 RX ORDER — CITRIC ACID/SODIUM CITRATE 334-500MG
30 SOLUTION, ORAL ORAL ONCE
Status: DISCONTINUED | OUTPATIENT
Start: 2024-02-14 | End: 2024-02-14

## 2024-02-14 RX ORDER — SODIUM CHLORIDE 9 MG/ML
INJECTION, SOLUTION INTRAVENOUS PRN
Status: DISCONTINUED | OUTPATIENT
Start: 2024-02-14 | End: 2024-02-16 | Stop reason: HOSPADM

## 2024-02-14 RX ORDER — METHYLERGONOVINE MALEATE 0.2 MG/ML
200 INJECTION INTRAVENOUS PRN
Status: DISCONTINUED | OUTPATIENT
Start: 2024-02-14 | End: 2024-02-14

## 2024-02-14 RX ORDER — FERROUS SULFATE 325(65) MG
325 TABLET ORAL EVERY OTHER DAY
Status: DISCONTINUED | OUTPATIENT
Start: 2024-02-14 | End: 2024-02-16 | Stop reason: HOSPADM

## 2024-02-14 RX ORDER — ACETAMINOPHEN 325 MG/1
975 TABLET ORAL ONCE
Status: COMPLETED | OUTPATIENT
Start: 2024-02-14 | End: 2024-02-14

## 2024-02-14 RX ADMIN — LIDOCAINE HYDROCHLORIDE 3 ML: 20 INJECTION, SOLUTION EPIDURAL; INFILTRATION; INTRACAUDAL; PERINEURAL at 08:45

## 2024-02-14 RX ADMIN — LIDOCAINE HYDROCHLORIDE 3 ML: 20 INJECTION, SOLUTION EPIDURAL; INFILTRATION; INTRACAUDAL; PERINEURAL at 08:34

## 2024-02-14 RX ADMIN — LIDOCAINE HYDROCHLORIDE 3 ML: 20 INJECTION, SOLUTION EPIDURAL; INFILTRATION; INTRACAUDAL; PERINEURAL at 08:42

## 2024-02-14 RX ADMIN — FAMOTIDINE 20 MG: 10 INJECTION, SOLUTION INTRAVENOUS at 08:03

## 2024-02-14 RX ADMIN — WATER 2000 MG: 1 INJECTION INTRAMUSCULAR; INTRAVENOUS; SUBCUTANEOUS at 08:38

## 2024-02-14 RX ADMIN — SODIUM CHLORIDE, POTASSIUM CHLORIDE, SODIUM LACTATE AND CALCIUM CHLORIDE: 600; 310; 30; 20 INJECTION, SOLUTION INTRAVENOUS at 08:46

## 2024-02-14 RX ADMIN — ONDANSETRON 4 MG: 2 INJECTION INTRAMUSCULAR; INTRAVENOUS at 09:01

## 2024-02-14 RX ADMIN — SODIUM CHLORIDE, POTASSIUM CHLORIDE, SODIUM LACTATE AND CALCIUM CHLORIDE: 600; 310; 30; 20 INJECTION, SOLUTION INTRAVENOUS at 19:08

## 2024-02-14 RX ADMIN — Medication 15 ML/HR: at 06:23

## 2024-02-14 RX ADMIN — Medication 909 ML/HR: at 09:00

## 2024-02-14 RX ADMIN — FENTANYL CITRATE 50 MCG: 50 INJECTION, SOLUTION INTRAMUSCULAR; INTRAVENOUS at 08:33

## 2024-02-14 RX ADMIN — PHENYLEPHRINE HYDROCHLORIDE 100 MCG: 10 INJECTION INTRAVENOUS at 08:55

## 2024-02-14 RX ADMIN — LIDOCAINE HYDROCHLORIDE 3 ML: 20 INJECTION, SOLUTION EPIDURAL; INFILTRATION; INTRACAUDAL; PERINEURAL at 08:37

## 2024-02-14 RX ADMIN — LABETALOL HYDROCHLORIDE 50 MG: 100 TABLET, FILM COATED ORAL at 20:51

## 2024-02-14 RX ADMIN — LIDOCAINE HYDROCHLORIDE 3 ML: 20 INJECTION, SOLUTION EPIDURAL; INFILTRATION; INTRACAUDAL; PERINEURAL at 08:43

## 2024-02-14 RX ADMIN — PHENYLEPHRINE HYDROCHLORIDE 100 MCG: 10 INJECTION INTRAVENOUS at 09:11

## 2024-02-14 RX ADMIN — KETOROLAC TROMETHAMINE 30 MG: 30 INJECTION INTRAMUSCULAR; INTRAVENOUS at 22:35

## 2024-02-14 RX ADMIN — KETOROLAC TROMETHAMINE 30 MG: 30 INJECTION INTRAMUSCULAR; INTRAVENOUS at 10:20

## 2024-02-14 RX ADMIN — FENTANYL CITRATE 50 MCG: 50 INJECTION, SOLUTION INTRAMUSCULAR; INTRAVENOUS at 08:38

## 2024-02-14 RX ADMIN — METOCLOPRAMIDE 10 MG: 5 INJECTION, SOLUTION INTRAMUSCULAR; INTRAVENOUS at 08:03

## 2024-02-14 RX ADMIN — SODIUM CHLORIDE, PRESERVATIVE FREE 10 ML: 5 INJECTION INTRAVENOUS at 23:09

## 2024-02-14 RX ADMIN — MORPHINE SULFATE 3 MG: 1 INJECTION, SOLUTION EPIDURAL; INTRATHECAL; INTRAVENOUS at 09:02

## 2024-02-14 RX ADMIN — ACETAMINOPHEN 975 MG: 325 TABLET ORAL at 08:02

## 2024-02-14 RX ADMIN — DOCUSATE SODIUM 100 MG: 100 CAPSULE, LIQUID FILLED ORAL at 20:44

## 2024-02-14 RX ADMIN — KETOROLAC TROMETHAMINE 30 MG: 30 INJECTION INTRAMUSCULAR; INTRAVENOUS at 16:31

## 2024-02-14 RX ADMIN — LIDOCAINE HYDROCHLORIDE 3 ML: 20 INJECTION, SOLUTION EPIDURAL; INFILTRATION; INTRACAUDAL; PERINEURAL at 08:39

## 2024-02-14 ASSESSMENT — PAIN DESCRIPTION - LOCATION
LOCATION: ABDOMEN
LOCATION: ABDOMEN
LOCATION: ABDOMEN;INCISION

## 2024-02-14 ASSESSMENT — PAIN DESCRIPTION - ONSET: ONSET: GRADUAL

## 2024-02-14 ASSESSMENT — PAIN DESCRIPTION - ORIENTATION
ORIENTATION: LOWER
ORIENTATION: LOWER

## 2024-02-14 ASSESSMENT — PAIN SCALES - GENERAL
PAINLEVEL_OUTOF10: 1
PAINLEVEL_OUTOF10: 1
PAINLEVEL_OUTOF10: 3
PAINLEVEL_OUTOF10: 0

## 2024-02-14 ASSESSMENT — PAIN - FUNCTIONAL ASSESSMENT
PAIN_FUNCTIONAL_ASSESSMENT: ACTIVITIES ARE NOT PREVENTED
PAIN_FUNCTIONAL_ASSESSMENT: ACTIVITIES ARE NOT PREVENTED

## 2024-02-14 ASSESSMENT — PAIN DESCRIPTION - FREQUENCY: FREQUENCY: INTERMITTENT

## 2024-02-14 ASSESSMENT — PAIN DESCRIPTION - DESCRIPTORS
DESCRIPTORS: SORE
DESCRIPTORS: ACHING;DISCOMFORT;SORE

## 2024-02-14 ASSESSMENT — PAIN DESCRIPTION - PAIN TYPE: TYPE: ACUTE PAIN;SURGICAL PAIN

## 2024-02-14 NOTE — L&D DELIVERY NOTE
PREOPERATIVE DIAGNOSES:     1.38+ intrauterine pregnancy.  2.  Chronic HTN  3. Arrest of dilation and descent      POSTOPERATIVE DIAGNOSES:     Same.      PROCEDURE: primary low transverse  section        SURGEON: Nuha Hendrickson DO       ASSISTANT:  Yogesh Seo MD  In the absence of a resident they provided assistance with retraction, exposure, delivery of the infant and suture cutting/management throughout the entire procedure  ESTIMATED BLOOD LOSS:  500ml        COMPLICATIONS:  None.         ANESTHESIA:   Spinal anesthesia        FINDINGS:   Live Born  Sex:  Male  Fetal Position:  Cephalic,   Apgars:  1 minute: 9; 5 minute: 9  Weight:  6 pounds, 8 ounces  Tubes, uterus, ovaries:  normal          DETAILS OF PROCEDURE:    The patient was taken to the operating room where Epidural anesthesia was administered and found to be adequate. Abdomen was prepped   and draped in the normal sterile fashion. Pagan catheter had previously been   placed. Pfannenstiel skin incision made with a scalpel, carried down to the fascia with cautery. The fascia was nicked in the midline and extended laterally with   cautery. Muscles were  in the midline. Peritoneum was grasped with   hemostats, tented up, and entered sharply. Peritoneal incision was extended   superiorly and inferiorly with good visualization of bladder.  Delee bladder blade was introduced. Bladder flap was created with sharp dissection. Low transverse uterine incision was made with a scalpel and extended bluntly. Membranes ruptured for Clear fluid. A viable male infant was delivered in the cephalic presentation without diffiuclty.  Baby was bulb suctioned on the abdomen. Cord was clamped and cut, and handed to waiting R.N. Cord gases and blood were obtained. Placenta was manually extracted with 3 vessel cord. Uterus was exteriorized, cleared of all clot and debris. Uterine incision   was closed with vicryl in a running locked fashion.  Good

## 2024-02-14 NOTE — ANESTHESIA PROCEDURE NOTES
Epidural Block    Patient location during procedure: OB  Start time: 2/13/2024 9:45 PM  End time: 2/13/2024 10:00 PM  Reason for block: labor epidural  Staffing  Performed: resident/CRNA   Anesthesiologist: Meredith Zambrano MD  Resident/CRNA: Sonny Al APRN - CRNA  Performed by: Sonny Al APRN - CRNA  Authorized by: Meredith Zambrano MD    Epidural  Patient position: sitting  Prep: ChloraPrep  Patient monitoring: cardiac monitor, continuous pulse ox and frequent blood pressure checks  Approach: midline  Location: L3-4  Injection technique: ABIEL air  Provider prep: mask and sterile gloves  Needle  Needle type: Tuohy   Needle gauge: 18 G  Needle length: 3.5 in  Needle insertion depth: 8 cm  Catheter type: end hole  Catheter size: 20 G  Catheter at skin depth: 18 cm  Test dose: negativeCatheter Secured: tegaderm and tape  Assessment  Hemodynamics: stable  Attempts: 1  Outcomes: uncomplicated and patient tolerated procedure well  Preanesthetic Checklist  Completed: patient identified, IV checked, site marked, risks and benefits discussed, surgical/procedural consents, equipment checked, pre-op evaluation, timeout performed, anesthesia consent given, oxygen available, monitors applied/VS acknowledged, fire risk safety assessment completed and verbalized and blood product R/B/A discussed and consented

## 2024-02-14 NOTE — PROGRESS NOTES
Call received from Dr. Hendrickson for patient update. Updated patient SVE 2/80/-1, ctx every 2-3 minutes on 14 of pitocin, and comfortable with epidural. RN to continue current plan of care.

## 2024-02-14 NOTE — PLAN OF CARE
Problem: Postpartum  Goal: Experiences normal postpartum course  Description:  Postpartum OB-Pregnancy care plan goal which identifies if the mother is experiencing a normal postpartum course  Outcome: Progressing  Goal: Appropriate maternal -  bonding  Description:  Postpartum OB-Pregnancy care plan goal which identifies if the mother and  are bonding appropriately  Outcome: Progressing  Goal: Establishment of infant feeding pattern  Description:  Postpartum OB-Pregnancy care plan goal which identifies if the mother is establishing a feeding pattern with their   Outcome: Progressing  Goal: Incisions, wounds, or drain sites healing without S/S of infection  Outcome: Progressing     Problem: Discharge Planning  Goal: Discharge to home or other facility with appropriate resources  Outcome: Progressing

## 2024-02-14 NOTE — PROGRESS NOTES
Dr Hendrickson updated SVE remains 1, pt bolusing for an epidural. GONZÁLEZ Sánchez will check pt after her epidural and update

## 2024-02-14 NOTE — PROGRESS NOTES
Dr. Hendrickson updated patient SVE remains unchanged. Order to stop pitocin at this time. Plan for c/s at 0830

## 2024-02-14 NOTE — LACTATION NOTE
Encouraged skin to skin and frequent attempts at breast to stimulate milk production. Instructed on normal infant behavior in the first 12-24 hours and importance of stimulating the baby frequently to eat during this time. Reviewed hand expression, and encouraged to hand express drops of colostrum when baby is sleepy. Instructed that baby may also feed 8-12 times a day- cluster feeding at times- as her milk supply is being established.  Instructed on benefits of skin to skin and limiting of pacifier / artificial nipple use until breastfeeding is well established.  Educated on making sure infant has an open airway while breastfeeding and skin to skin. Instructed on hunger cues and waking techniques to try. Reviewed signs of adequate I & O; allow baby to feed ad mildred and not to limit time at breast. Breastfeeding booklet provided with review of its contents. Encouraged to call with any concerns.      RONNIE Kumar

## 2024-02-14 NOTE — PROGRESS NOTES
Patient admitted into room and oriented to surroundings. Introduced self and updated patient's white board. Phone and nurse's call light at patient's bedside and instructed to use for any needs. Patient instructed on new admission informational packet at bedside with information on infant testing to be done when infant is 24 hours old including ODH labs, 24 hours blood sugar, and CCHD. Patient instructed on mom baby unit policies and procedures including infant safety and fall prevention, of need to keep infant in bassinet for transport in hallways, and for infant to sleep alone, on back, in an empty bassinet. Patient also instructed on unit visitation policy and that one same support person 18 years old or older may stay overnight if desired. Patient verbalized understanding.

## 2024-02-14 NOTE — PROGRESS NOTES
GONZÁLEZ Sánchez CNM updated pt SVE remains 1, pt is bolusing for an epidural. States she will come recheck pt after her epidural

## 2024-02-15 LAB
HCT VFR BLD AUTO: 35.5 % (ref 34–48)
HGB BLD-MCNC: 11.8 G/DL (ref 11.5–15.5)

## 2024-02-15 PROCEDURE — 6370000000 HC RX 637 (ALT 250 FOR IP): Performed by: ANESTHESIOLOGY

## 2024-02-15 PROCEDURE — 36415 COLL VENOUS BLD VENIPUNCTURE: CPT

## 2024-02-15 PROCEDURE — 6370000000 HC RX 637 (ALT 250 FOR IP): Performed by: OBSTETRICS & GYNECOLOGY

## 2024-02-15 PROCEDURE — 1220000000 HC SEMI PRIVATE OB R&B

## 2024-02-15 PROCEDURE — 85014 HEMATOCRIT: CPT

## 2024-02-15 PROCEDURE — 85018 HEMOGLOBIN: CPT

## 2024-02-15 RX ORDER — ONDANSETRON 4 MG/1
4 TABLET, ORALLY DISINTEGRATING ORAL EVERY 8 HOURS PRN
Status: DISCONTINUED | OUTPATIENT
Start: 2024-02-15 | End: 2024-02-16 | Stop reason: HOSPADM

## 2024-02-15 RX ORDER — DIPHENHYDRAMINE HYDROCHLORIDE 50 MG/ML
25 INJECTION INTRAMUSCULAR; INTRAVENOUS EVERY 6 HOURS PRN
Status: DISCONTINUED | OUTPATIENT
Start: 2024-02-15 | End: 2024-02-16 | Stop reason: HOSPADM

## 2024-02-15 RX ORDER — OXYCODONE HYDROCHLORIDE 5 MG/1
10 TABLET ORAL EVERY 4 HOURS PRN
Status: DISCONTINUED | OUTPATIENT
Start: 2024-02-15 | End: 2024-02-16 | Stop reason: HOSPADM

## 2024-02-15 RX ORDER — OXYCODONE HYDROCHLORIDE 5 MG/1
5 TABLET ORAL EVERY 4 HOURS PRN
Status: DISCONTINUED | OUTPATIENT
Start: 2024-02-15 | End: 2024-02-16 | Stop reason: HOSPADM

## 2024-02-15 RX ORDER — ONDANSETRON 2 MG/ML
4 INJECTION INTRAMUSCULAR; INTRAVENOUS EVERY 6 HOURS PRN
Status: DISCONTINUED | OUTPATIENT
Start: 2024-02-15 | End: 2024-02-16 | Stop reason: HOSPADM

## 2024-02-15 RX ORDER — IBUPROFEN 800 MG/1
800 TABLET ORAL EVERY 8 HOURS PRN
Status: DISCONTINUED | OUTPATIENT
Start: 2024-02-15 | End: 2024-02-16 | Stop reason: HOSPADM

## 2024-02-15 RX ORDER — ACETAMINOPHEN 500 MG
1000 TABLET ORAL EVERY 8 HOURS PRN
Status: DISCONTINUED | OUTPATIENT
Start: 2024-02-15 | End: 2024-02-16 | Stop reason: HOSPADM

## 2024-02-15 RX ADMIN — LABETALOL HYDROCHLORIDE 50 MG: 100 TABLET, FILM COATED ORAL at 21:08

## 2024-02-15 RX ADMIN — OXYCODONE 5 MG: 5 TABLET ORAL at 13:35

## 2024-02-15 RX ADMIN — ACETAMINOPHEN 1000 MG: 500 TABLET ORAL at 19:57

## 2024-02-15 RX ADMIN — OXYCODONE 10 MG: 5 TABLET ORAL at 06:04

## 2024-02-15 RX ADMIN — DOCUSATE SODIUM 100 MG: 100 CAPSULE, LIQUID FILLED ORAL at 19:57

## 2024-02-15 RX ADMIN — SIMETHICONE 80 MG: 80 TABLET, CHEWABLE ORAL at 09:06

## 2024-02-15 RX ADMIN — LABETALOL HYDROCHLORIDE 50 MG: 100 TABLET, FILM COATED ORAL at 09:06

## 2024-02-15 RX ADMIN — IBUPROFEN 800 MG: 800 TABLET, FILM COATED ORAL at 13:36

## 2024-02-15 RX ADMIN — OXYCODONE 10 MG: 5 TABLET ORAL at 19:57

## 2024-02-15 RX ADMIN — DOCUSATE SODIUM 100 MG: 100 CAPSULE, LIQUID FILLED ORAL at 09:06

## 2024-02-15 ASSESSMENT — PAIN DESCRIPTION - LOCATION
LOCATION: ABDOMEN;INCISION
LOCATION: ABDOMEN;INCISION
LOCATION: ABDOMEN

## 2024-02-15 ASSESSMENT — PAIN DESCRIPTION - FREQUENCY: FREQUENCY: INTERMITTENT

## 2024-02-15 ASSESSMENT — PAIN DESCRIPTION - PAIN TYPE: TYPE: ACUTE PAIN;SURGICAL PAIN

## 2024-02-15 ASSESSMENT — PAIN SCALES - GENERAL
PAINLEVEL_OUTOF10: 7
PAINLEVEL_OUTOF10: 6
PAINLEVEL_OUTOF10: 8

## 2024-02-15 ASSESSMENT — PAIN DESCRIPTION - ONSET: ONSET: GRADUAL

## 2024-02-15 ASSESSMENT — PAIN DESCRIPTION - ORIENTATION: ORIENTATION: LOWER

## 2024-02-15 ASSESSMENT — PAIN DESCRIPTION - DESCRIPTORS
DESCRIPTORS: ACHING;DISCOMFORT;SORE
DESCRIPTORS: ACHING;BURNING;SORE

## 2024-02-15 NOTE — PROGRESS NOTES
Progress Note    SUBJECTIVE:   Pt comfortable; +void; +flatus; +ambulation    OBJECTIVE:    VITALS:  /75   Pulse 67   Temp 97.8 °F (36.6 °C) (Temporal)   Resp 16   Ht 1.676 m (5' 6\")   Wt 117.9 kg (260 lb)   LMP 05/20/2023   SpO2 97%   Breastfeeding Unknown   BMI 41.97 kg/m²   Physical Exam  ABDOMEN:  normal bowel sounds, non-distended, non-tender and Incision d/i  Ext: nt    DATA:  Hemoglobin/Hematocrit:    Lab Results   Component Value Date/Time    HGB 11.8 02/15/2024 05:40 AM    HCT 35.5 02/15/2024 05:40 AM       ASSESSMENT AND PLAN:  S/p ltcs  Principal Problem:    38 weeks gestation of pregnancy  Resolved Problems:    * No resolved hospital problems. *    POD#1  Plan discharge home     Electronically signed by Nuha Hendrickson DO on 2/15/2024 at 5:12 PM

## 2024-02-15 NOTE — LACTATION NOTE
Mom mostly formula feeding, struggling with latch and worried about milk supply. Discussed normal milk production and the importance of frequent feeds to stimulate production and establish supply. Support provided and encouraged to call with any needs.

## 2024-02-15 NOTE — PROGRESS NOTES
Name: Leta Bruno                Roman Catholic: Amish   Referral: [facility]      Assessment:  Parent(s) were receptive to  visit and baby blessing.        Intervention:   provided blessing for new born and parent(s) and distributed prayer card for family. Patient shared...     Outcome:  Parent/S appreciated blessing for child.     Plan:  Chaplains will remain available to offer spiritual and emotional support as needed.       Electronically signed by VANGIE Curran, on 2/15/2024 at 3:56 PM.  [facility]  [facility]  [phone]

## 2024-02-15 NOTE — ANESTHESIA POSTPROCEDURE EVALUATION
Department of Anesthesiology  Postprocedure Note    Patient: Leta Bruno  MRN: [id number]  YOB: 1995  Date of evaluation: 2/15/2024    Procedure Summary     Date: 24 Room / Location: [address]    Anesthesia Start:  Anesthesia Stop: 24 0932    Procedures:        SECTION (Uterus)      Labor Analgesia Diagnosis:       Failure to progress in labor      (Failure to progress in labor [O62.2])    Surgeons: Nuha Hendrickson DO Responsible Provider: Tim Arellano DO    Anesthesia Type: epidural, spinal, general ASA Status: 3          Anesthesia Type: No value filed.    Jose Phase I: Jose Score: 10    Jose Phase II:      Anesthesia Post Evaluation    Patient location during evaluation: floor  Patient participation: complete - patient participated  Level of consciousness: awake and alert  Pain score: 0  Airway patency: patent  Nausea & Vomiting: no nausea and no vomiting  Cardiovascular status: blood pressure returned to baseline  Respiratory status: acceptable  Hydration status: stable  Pain management: adequate        No notable events documented.

## 2024-02-15 NOTE — CARE COORDINATION
SW Discharge Planning   SW received consult for \" UDS positive for MJ\" ( 2/12/24)     KOJO met privately with Leta Bruno ( [phone]) first time mother to baby boy Hang Wright  ( 2/14/23) and introduced self and role. Leta stated that she resides at the address listed in the chart and is currently employed at [employer]; baby will be added to her [employer] insurance . Per Leta prenatal  care was with Dr. Hendrickson and she is still in the process of choosing a pediatrician. Leta Reported that she has all needed items including a car seat and pack and play. We discussed safe sleep practices. Leta was agreeable to a [facility] referral and WIC. Leta  denied any past or current history of children services involvement, legal issues, substance abuse aside from THC, domestic violence or mental health diagnosis.  We discussed awareness of Post Partum Depression and encouraged contact with her OB if any problems arise. SW did address THC usage, and Leta did report using THC throughout pregnancy and expressed understanding for the need of a [facility] ( [phone])  referral. SW compelted a [facility] ( [phone])  to [place].    PLAN    Baby can NOT be discharged home until [facility] ( [phone]) provides disposition  SW to continue communication with nursing staff and [facility] ( [phone])    [facility] and WIC referrals were completed    Electronically signed by ANTONIA Nieto on 2/15/2024 at 8:50 AM .

## 2024-02-15 NOTE — PLAN OF CARE
Problem: Postpartum  Goal: Experiences normal postpartum course  Description:  Postpartum OB-Pregnancy care plan goal which identifies if the mother is experiencing a normal postpartum course  Outcome: Progressing     Problem: Postpartum  Goal: Appropriate maternal -  bonding  Description:  Postpartum OB-Pregnancy care plan goal which identifies if the mother and  are bonding appropriately  Outcome: Progressing     Problem: Postpartum  Goal: Establishment of infant feeding pattern  Description:  Postpartum OB-Pregnancy care plan goal which identifies if the mother is establishing a feeding pattern with their   Outcome: Progressing     Problem: Postpartum  Goal: Incisions, wounds, or drain sites healing without S/S of infection  Outcome: Progressing     Problem: Discharge Planning  Goal: Discharge to home or other facility with appropriate resources  Outcome: Progressing     Problem: Pain  Goal: Verbalizes/displays adequate comfort level or baseline comfort level  Outcome: Progressing     Problem: ABCDS Injury Assessment  Goal: Absence of physical injury  Outcome: Progressing

## 2024-02-15 NOTE — PROGRESS NOTES
[facility] screening results were discussed with parent. Questions answered. Brochure given to parent. Advised to monitor developmental milestones and contact physician for any concerns.   Amada Rowland, AuD

## 2024-02-15 NOTE — PROGRESS NOTES
Foy catheter emptied for 150 ml of yellow urine. Patient up to the bathroom; foy catheter removed and hortensia care completed. This RN educated the patient to call out to her RN if bleeding through a pad an hour and or if having large size clots; patient verbalized understanding. Patient back to bed; tolerated well.

## 2024-02-16 VITALS
BODY MASS INDEX: 41.78 KG/M2 | SYSTOLIC BLOOD PRESSURE: 148 MMHG | HEIGHT: 66 IN | OXYGEN SATURATION: 99 % | HEART RATE: 85 BPM | WEIGHT: 260 LBS | RESPIRATION RATE: 18 BRPM | DIASTOLIC BLOOD PRESSURE: 77 MMHG | TEMPERATURE: 98.6 F

## 2024-02-16 PROCEDURE — 6370000000 HC RX 637 (ALT 250 FOR IP): Performed by: OBSTETRICS & GYNECOLOGY

## 2024-02-16 RX ORDER — HYDROCODONE BITARTRATE AND ACETAMINOPHEN 5; 325 MG/1; MG/1
1 TABLET ORAL EVERY 6 HOURS PRN
Qty: 12 TABLET | Refills: 0 | Status: SHIPPED | OUTPATIENT
Start: 2024-02-16 | End: 2024-02-19

## 2024-02-16 RX ADMIN — IBUPROFEN 800 MG: 800 TABLET, FILM COATED ORAL at 08:28

## 2024-02-16 RX ADMIN — OXYCODONE 10 MG: 5 TABLET ORAL at 02:11

## 2024-02-16 RX ADMIN — ACETAMINOPHEN 1000 MG: 500 TABLET ORAL at 13:24

## 2024-02-16 RX ADMIN — DOCUSATE SODIUM 100 MG: 100 CAPSULE, LIQUID FILLED ORAL at 08:28

## 2024-02-16 RX ADMIN — LABETALOL HYDROCHLORIDE 50 MG: 100 TABLET, FILM COATED ORAL at 08:28

## 2024-02-16 ASSESSMENT — PAIN DESCRIPTION - FREQUENCY
FREQUENCY: INTERMITTENT
FREQUENCY: INTERMITTENT

## 2024-02-16 ASSESSMENT — PAIN DESCRIPTION - DESCRIPTORS
DESCRIPTORS: ACHING;DISCOMFORT;SORE

## 2024-02-16 ASSESSMENT — PAIN DESCRIPTION - ORIENTATION
ORIENTATION: LOWER

## 2024-02-16 ASSESSMENT — PAIN SCALES - GENERAL
PAINLEVEL_OUTOF10: 4
PAINLEVEL_OUTOF10: 5
PAINLEVEL_OUTOF10: 3
PAINLEVEL_OUTOF10: 7

## 2024-02-16 ASSESSMENT — PAIN DESCRIPTION - PAIN TYPE
TYPE: ACUTE PAIN;SURGICAL PAIN
TYPE: ACUTE PAIN;SURGICAL PAIN

## 2024-02-16 ASSESSMENT — PAIN DESCRIPTION - LOCATION
LOCATION: ABDOMEN
LOCATION: ABDOMEN;INCISION

## 2024-02-16 ASSESSMENT — PAIN DESCRIPTION - ONSET
ONSET: GRADUAL
ONSET: GRADUAL

## 2024-02-16 ASSESSMENT — PAIN - FUNCTIONAL ASSESSMENT
PAIN_FUNCTIONAL_ASSESSMENT: ACTIVITIES ARE NOT PREVENTED

## 2024-02-16 NOTE — PROGRESS NOTES
Progress Note    SUBJECTIVE: patient status post c section delivery day 2 doing well , normal postpartum course.  Accepted level of pain    OBJECTIVE:    VITALS:  BP (!) 148/77 Comment: RN notified  Pulse 85   Temp 98.6 °F (37 °C) (Temporal)   Resp 18   Ht 1.676 m (5' 6\")   Wt 117.9 kg (260 lb)   LMP 05/20/2023   SpO2 99%   Breastfeeding Unknown   BMI 41.97 kg/m²   Physical Exam  lung:cta  Heart : regular rythm  Abdomen:soft  Appropriate tendernessr ,firm uterus ,bs present,incision clear and dry.  Extremities :normal no evidence of DVT  DATA:  CBC:   Lab Results   Component Value Date/Time    WBC 8.8 02/12/2024 10:50 PM    RBC 4.07 02/12/2024 10:50 PM    HGB 11.8 02/15/2024 05:40 AM    HCT 35.5 02/15/2024 05:40 AM    MCV 85.3 02/12/2024 10:50 PM    MCH 29.0 02/12/2024 10:50 PM    MCHC 34.0 02/12/2024 10:50 PM    RDW 13.8 02/12/2024 10:50 PM     02/12/2024 10:50 PM    MPV 10.7 02/12/2024 10:50 PM       ASSESSMENT AND PLAN:  Patient Active Problem List   Diagnosis    Essential hypertension    Hip strain, right, initial encounter    PCOS (polycystic ovarian syndrome)    HSV infection    Chronic hypertension affecting pregnancy    38 weeks gestation of pregnancy       Normal post partum care   Anticipate discharge home

## 2024-02-16 NOTE — LACTATION NOTE
Baby has been formula feeding.  Mom desires to provide breast milk.  Taught mom how to pace bottle feed baby and to always offer breast first.  Taught hand expression.  Attempted a breastfeed but baby would not stay latched so a nipple shield was introduced with success.  Baited the nipple shield with infant formula.   Encouraged mom to call us for help.

## 2024-02-16 NOTE — DISCHARGE INSTRUCTIONS
Follow up with your OB doctor in 10 days for a  delivery unless otherwise instructed.  Call office for appointment..  For breastfeeding support, you can contact our lactation specialists at [phone] or   [phone].                                                                  DIET  Eat a well balanced diet focusing on foods high in fiber and protein  Drink plenty of fluids especially water.  To avoid constipation you may take a mild stool softener as recommended by your doctor or midwife.    ACTIVITY  Gradually increase your activity.  Resume exercise regimen only after advised by your doctor or midwife.  Avoid lifting anything heavier than your baby or a gallon of milk until OK'd by your physician or midlife.   Avoid driving until your doctor or midwife has given their approval.  Rise slowly from a lying to sitting and then a standing position.  Climb stairs one at a time.  Use caution when carrying your baby up and down the stairs.  No sexual activity for 6 weeks or until advised by your doctor - Nothing in vagina: intercourse, tampons, or douching.   Be prepared to discuss family planning at your follow-up OB visit.   You may feel tired or have a lack of energy.  You may continue your prenatal vitamin to replenish nutrients post delivery.  Nap when infant naps to catch up on sleep.  May return to work or school in 6 weeks or as directed by physician or midlife.   Take pain medication as prescribed whenever you need them  Take care of yourself by sleeping/resting as much as possible.  Let someone else care for you, your infant and housework as much as possible for a while.    EMOTIONS  You may feed veengas, sad, teary, & overwhelmed.    If infant will not stop crying, contact another adult for help or place infant in their crib on their back and take a break.  NEVER shake your infant.    Call your doctor if you have unrelieved feelings of: inability to cope, anxiety, lack of interest in the

## 2024-02-16 NOTE — PLAN OF CARE
Problem: Postpartum  Goal: Experiences normal postpartum course  Description:  Postpartum OB-Pregnancy care plan goal which identifies if the mother is experiencing a normal postpartum course  Outcome: Progressing  Goal: Appropriate maternal -  bonding  Description:  Postpartum OB-Pregnancy care plan goal which identifies if the mother and  are bonding appropriately  Outcome: Progressing  Goal: Establishment of infant feeding pattern  Description:  Postpartum OB-Pregnancy care plan goal which identifies if the mother is establishing a feeding pattern with their   Outcome: Progressing  Goal: Incisions, wounds, or drain sites healing without S/S of infection  Outcome: Progressing     Problem: Discharge Planning  Goal: Discharge to home or other facility with appropriate resources  Outcome: Progressing     Problem: Pain  Goal: Verbalizes/displays adequate comfort level or baseline comfort level  Outcome: Progressing     Problem: ABCDS Injury Assessment  Goal: Absence of physical injury  Outcome: Progressing

## 2024-02-16 NOTE — PLAN OF CARE
Problem: Postpartum  Goal: Experiences normal postpartum course  Description:  Postpartum OB-Pregnancy care plan goal which identifies if the mother is experiencing a normal postpartum course  2024 by Malachi Ascencio RN  Outcome: Completed  2024 by Lolly Pascal RN  Outcome: Progressing  Goal: Appropriate maternal -  bonding  Description:  Postpartum OB-Pregnancy care plan goal which identifies if the mother and  are bonding appropriately  2024 by Malachi Ascencio RN  Outcome: Completed  2024 by Lolly Pascal RN  Outcome: Progressing  Goal: Establishment of infant feeding pattern  Description:  Postpartum OB-Pregnancy care plan goal which identifies if the mother is establishing a feeding pattern with their   2024 by Malachi Ascencio RN  Outcome: Completed  2024 by Lolly Pascal RN  Outcome: Progressing  Goal: Incisions, wounds, or drain sites healing without S/S of infection  2024 by Malachi Ascencio RN  Outcome: Completed  2024 by Lolly Pascal RN  Outcome: Progressing     Problem: Discharge Planning  Goal: Discharge to home or other facility with appropriate resources  2024 by Malachi Ascencio RN  Outcome: Completed  2024 by Lolly Pascal RN  Outcome: Progressing     Problem: Pain  Goal: Verbalizes/displays adequate comfort level or baseline comfort level  2024 by Malachi Ascencio RN  Outcome: Completed  2024 by Lolly Pascal RN  Outcome: Progressing     Problem: ABCDS Injury Assessment  Goal: Absence of physical injury  2024 by Malachi Ascencio RN  Outcome: Completed  2024 by Lolly Pascal RN  Outcome: Progressing

## 2024-02-19 LAB — SURGICAL PATHOLOGY REPORT: NORMAL

## 2024-02-22 NOTE — PROGRESS NOTES
CLINICAL PHARMACY NOTE: MEDS TO BEDS    Total # of Prescriptions Filled: 1   The following medications were delivered to the patient:  Norco 5/325 mg    Additional Documentation:

## 2025-04-09 ENCOUNTER — OFFICE VISIT (OUTPATIENT)
Dept: PRIMARY CARE CLINIC | Age: 30
End: 2025-04-09

## 2025-04-09 VITALS
DIASTOLIC BLOOD PRESSURE: 88 MMHG | SYSTOLIC BLOOD PRESSURE: 138 MMHG | HEIGHT: 66 IN | WEIGHT: 237 LBS | TEMPERATURE: 97.5 F | HEART RATE: 77 BPM | OXYGEN SATURATION: 98 % | BODY MASS INDEX: 38.09 KG/M2

## 2025-04-09 DIAGNOSIS — B07.0 PLANTAR WART: ICD-10-CM

## 2025-04-09 DIAGNOSIS — E66.3 OVERWEIGHT: ICD-10-CM

## 2025-04-09 DIAGNOSIS — I10 ESSENTIAL HYPERTENSION: ICD-10-CM

## 2025-04-09 DIAGNOSIS — G43.009 MIGRAINE WITHOUT AURA AND WITHOUT STATUS MIGRAINOSUS, NOT INTRACTABLE: Primary | ICD-10-CM

## 2025-04-09 LAB
ALBUMIN: 4.3 G/DL (ref 3.5–5.2)
ALP BLD-CCNC: 52 U/L (ref 35–104)
ALT SERPL-CCNC: 35 U/L (ref 0–32)
ANION GAP SERPL CALCULATED.3IONS-SCNC: 15 MMOL/L (ref 7–16)
AST SERPL-CCNC: 24 U/L (ref 0–31)
BILIRUB SERPL-MCNC: 0.4 MG/DL (ref 0–1.2)
BUN BLDV-MCNC: 9 MG/DL (ref 6–20)
CALCIUM SERPL-MCNC: 9.1 MG/DL (ref 8.6–10.2)
CHLORIDE BLD-SCNC: 103 MMOL/L (ref 98–107)
CHOLESTEROL, TOTAL: 157 MG/DL
CO2: 20 MMOL/L (ref 22–29)
CREAT SERPL-MCNC: 0.9 MG/DL (ref 0.5–1)
GFR, ESTIMATED: 87 ML/MIN/1.73M2
GLUCOSE BLD-MCNC: 91 MG/DL (ref 74–99)
HCT VFR BLD CALC: 43.7 % (ref 34–48)
HDLC SERPL-MCNC: 48 MG/DL
HEMOGLOBIN: 14.5 G/DL (ref 11.5–15.5)
LDL CHOLESTEROL: 85 MG/DL
MCH RBC QN AUTO: 28.8 PG (ref 26–35)
MCHC RBC AUTO-ENTMCNC: 33.2 G/DL (ref 32–34.5)
MCV RBC AUTO: 86.7 FL (ref 80–99.9)
PDW BLD-RTO: 12.9 % (ref 11.5–15)
PLATELET # BLD: 273 K/UL (ref 130–450)
PMV BLD AUTO: 10.7 FL (ref 7–12)
POTASSIUM SERPL-SCNC: 4.4 MMOL/L (ref 3.5–5)
RBC # BLD: 5.04 M/UL (ref 3.5–5.5)
SODIUM BLD-SCNC: 138 MMOL/L (ref 132–146)
TOTAL PROTEIN: 7.5 G/DL (ref 6.4–8.3)
TRIGL SERPL-MCNC: 122 MG/DL
TSH SERPL DL<=0.05 MIU/L-ACNC: 1.82 UIU/ML (ref 0.27–4.2)
VLDLC SERPL CALC-MCNC: 24 MG/DL
WBC # BLD: 7.2 K/UL (ref 4.5–11.5)

## 2025-04-09 SDOH — ECONOMIC STABILITY: FOOD INSECURITY: WITHIN THE PAST 12 MONTHS, YOU WORRIED THAT YOUR FOOD WOULD RUN OUT BEFORE YOU GOT MONEY TO BUY MORE.: NEVER TRUE

## 2025-04-09 SDOH — ECONOMIC STABILITY: FOOD INSECURITY: WITHIN THE PAST 12 MONTHS, THE FOOD YOU BOUGHT JUST DIDN'T LAST AND YOU DIDN'T HAVE MONEY TO GET MORE.: NEVER TRUE

## 2025-04-09 SDOH — HEALTH STABILITY: PHYSICAL HEALTH: ON AVERAGE, HOW MANY MINUTES DO YOU ENGAGE IN EXERCISE AT THIS LEVEL?: 60 MIN

## 2025-04-09 SDOH — HEALTH STABILITY: PHYSICAL HEALTH: ON AVERAGE, HOW MANY DAYS PER WEEK DO YOU ENGAGE IN MODERATE TO STRENUOUS EXERCISE (LIKE A BRISK WALK)?: 3 DAYS

## 2025-04-09 ASSESSMENT — PATIENT HEALTH QUESTIONNAIRE - PHQ9
SUM OF ALL RESPONSES TO PHQ QUESTIONS 1-9: 1
SUM OF ALL RESPONSES TO PHQ QUESTIONS 1-9: 1
1. LITTLE INTEREST OR PLEASURE IN DOING THINGS: NOT AT ALL
SUM OF ALL RESPONSES TO PHQ QUESTIONS 1-9: 1
SUM OF ALL RESPONSES TO PHQ QUESTIONS 1-9: 1
2. FEELING DOWN, DEPRESSED OR HOPELESS: SEVERAL DAYS

## 2025-04-09 NOTE — PROGRESS NOTES
Cryotherapy of left middle finger wart:    After verbal consent was obtained.  Cryotherapy was used for approximately 30 seconds to the area of concern.  Patient tolerated the procedure well with no immediate complication.    
tablet by mouth daily 1 packet 12     No current facility-administered medications for this visit.        Gerson Wood, DO       This document may have been prepared at least partially through the use of voice recognition software. Although effort is taken to assure the accuracy ofthis document, it is possible that grammatical, syntax,  or spelling errors may occur.

## 2025-04-10 ENCOUNTER — RESULTS FOLLOW-UP (OUTPATIENT)
Dept: PRIMARY CARE CLINIC | Age: 30
End: 2025-04-10

## 2025-04-29 NOTE — PROGRESS NOTES
Cryotherapy of left middle finger wart:    After verbal consent was obtained.  Cryotherapy was used for approximately 30 seconds to the area of concern.  Patient tolerated the procedure well with no immediate complication.

## 2025-04-29 NOTE — PROGRESS NOTES
[facility]  Department of Family Medicine      Patient:  Leat Bruno 29 y.o. female     Date of Service: 25      Chief complaint:   Chief Complaint   Patient presents with    Plantar wart    Discuss Medications     For bp         History ofPresent Illness   The patient is a 29 y.o. female  presented to the clinic with complaints as above.    Has skin lesion on middle finger on left, noticed this a few months ago  -did cryotherapy last visit, did not help much     Still having issues with elevated blood pressure   -is not breast feeding       Past Medical History:      Diagnosis Date    Chlamydia     Essential hypertension 2020    Gonorrhea     Herpes     Migraine        PastSurgical History:        Procedure Laterality Date     SECTION N/A 2024     SECTION performed by Nuha Hendrickson DO at [facility] L[facility]D OR    TONSILLECTOMY         Allergies:    Clindamycin/lincomycin    Social History:   Social History     Socioeconomic History    Marital status: Single     Spouse name: Not on file    Number of children: Not on file    Years of education: Not on file    Highest education level: Not on file   Occupational History    Not on file   Tobacco Use    Smoking status: Former    Smokeless tobacco: Never   Vaping Use    Vaping status: Former   Substance and Sexual Activity    Alcohol use: No     Comment: occ    Drug use: Not Currently     Types: Marijuana (Weed)     Comment: occ    Sexual activity: Yes     Partners: Male   Other Topics Concern    Not on file   Social History Narrative    Not on file     Social Drivers of Health     Financial Resource Strain: Low Risk  (2021)    Overall Financial Resource Strain (CARDIA)     Difficulty of Paying Living Expenses: Not hard at all   Food Insecurity: No Food Insecurity (2025)    Hunger Vital Sign     Worried About Running Out of Food in the Last Year: Never true     Ran Out of Food in the Last Year: Never true

## 2025-04-30 ENCOUNTER — OFFICE VISIT (OUTPATIENT)
Dept: PRIMARY CARE CLINIC | Age: 30
End: 2025-04-30
Payer: COMMERCIAL

## 2025-04-30 VITALS
HEIGHT: 66 IN | WEIGHT: 236 LBS | SYSTOLIC BLOOD PRESSURE: 142 MMHG | RESPIRATION RATE: 16 BRPM | BODY MASS INDEX: 37.93 KG/M2 | HEART RATE: 98 BPM | DIASTOLIC BLOOD PRESSURE: 100 MMHG | TEMPERATURE: 97.5 F | OXYGEN SATURATION: 99 %

## 2025-04-30 DIAGNOSIS — B07.0 PLANTAR WART: ICD-10-CM

## 2025-04-30 DIAGNOSIS — I10 ESSENTIAL HYPERTENSION: Primary | ICD-10-CM

## 2025-04-30 PROCEDURE — 99214 OFFICE O/P EST MOD 30 MIN: CPT | Performed by: STUDENT IN AN ORGANIZED HEALTH CARE EDUCATION/TRAINING PROGRAM

## 2025-04-30 PROCEDURE — 3080F DIAST BP >= 90 MM HG: CPT | Performed by: STUDENT IN AN ORGANIZED HEALTH CARE EDUCATION/TRAINING PROGRAM

## 2025-04-30 PROCEDURE — 17110 DESTRUCTION B9 LES UP TO 14: CPT | Performed by: STUDENT IN AN ORGANIZED HEALTH CARE EDUCATION/TRAINING PROGRAM

## 2025-04-30 PROCEDURE — 3077F SYST BP >= 140 MM HG: CPT | Performed by: STUDENT IN AN ORGANIZED HEALTH CARE EDUCATION/TRAINING PROGRAM

## 2025-04-30 RX ORDER — LABETALOL 100 MG/1
100 TABLET, FILM COATED ORAL 2 TIMES DAILY
Qty: 60 TABLET | Refills: 0 | Status: SHIPPED | OUTPATIENT
Start: 2025-04-30

## 2025-05-21 ENCOUNTER — OFFICE VISIT (OUTPATIENT)
Dept: PRIMARY CARE CLINIC | Age: 30
End: 2025-05-21
Payer: COMMERCIAL

## 2025-05-21 VITALS
HEART RATE: 86 BPM | WEIGHT: 238 LBS | TEMPERATURE: 97.5 F | OXYGEN SATURATION: 97 % | SYSTOLIC BLOOD PRESSURE: 142 MMHG | BODY MASS INDEX: 38.25 KG/M2 | RESPIRATION RATE: 14 BRPM | DIASTOLIC BLOOD PRESSURE: 90 MMHG | HEIGHT: 66 IN

## 2025-05-21 DIAGNOSIS — I10 ESSENTIAL HYPERTENSION: Primary | ICD-10-CM

## 2025-05-21 DIAGNOSIS — B07.0 PLANTAR WART: ICD-10-CM

## 2025-05-21 PROCEDURE — 17110 DESTRUCTION B9 LES UP TO 14: CPT | Performed by: STUDENT IN AN ORGANIZED HEALTH CARE EDUCATION/TRAINING PROGRAM

## 2025-05-21 PROCEDURE — 3077F SYST BP >= 140 MM HG: CPT | Performed by: STUDENT IN AN ORGANIZED HEALTH CARE EDUCATION/TRAINING PROGRAM

## 2025-05-21 PROCEDURE — 99213 OFFICE O/P EST LOW 20 MIN: CPT | Performed by: STUDENT IN AN ORGANIZED HEALTH CARE EDUCATION/TRAINING PROGRAM

## 2025-05-21 PROCEDURE — 3080F DIAST BP >= 90 MM HG: CPT | Performed by: STUDENT IN AN ORGANIZED HEALTH CARE EDUCATION/TRAINING PROGRAM

## 2025-05-21 RX ORDER — LABETALOL 200 MG/1
200 TABLET, FILM COATED ORAL 2 TIMES DAILY
Qty: 60 TABLET | Refills: 0
Start: 2025-05-21

## 2025-05-21 NOTE — PROGRESS NOTES
Cryotherapy of left middle finger wart:     After verbal consent was obtained.  Cryotherapy was used for approximately 30 seconds to the area of concern.  Patient tolerated the procedure well with no immediate complication  
200 MG tablet; Take 1 tablet by mouth 2 times daily  Dispense: 60 tablet; Refill: 0    2. Plantar wart  See separate note.  - DESTRUC BENIGN LESION, UP TO 14 LESIONS              I am the primary care provider for this patient and provide the patient with continuity of care.      Counseled regarding above diagnosis, including possible risks and complications,  especially if left uncontrolled. Counseled regarding the possible side effects, risks, benefits and alternatives to treatment;patient and/or guardian verbalizes understanding, agrees, feels comfortable with and wishes to proceed with above treatment plan.    Call or go to EDmediately if symptoms worsen or persist. Advised patient to call with any new medication issues, and, as applicable, read all Rx info from pharmacy to assure aware of all possible risks and side effects of medicationbefore taking.     Patient and/or guardian given opportunity to ask questions/raise concerns.  The patient verbalized comfort and understanding ofinstructions.    I encourage further reading and education about your health conditions.  Information on many health conditions is provided by the[facility] [employer]: [email or web address].[email or web address]/  Please bring any questions to me at your nextvisit.    Return to Office: Return in about 3 weeks (around 6/11/2025) for f/u htn and wart.    Medication List:    Current Outpatient Medications   Medication Sig Dispense Refill    labetalol (NORMODYNE) 200 MG tablet Take 1 tablet by mouth 2 times daily 60 tablet 0    norethindrone-ethinyl estradiol (LOESTRIN FE 1/20) 1-20 MG-MCG per tablet Take 1 tablet by mouth daily 1 packet 12     No current facility-administered medications for this visit.        Gerson Wood, DO       This document may have been prepared at least partially through the use of voice recognition software. Although effort is taken to assure the accuracy ofthis document, it is possible that grammatical,

## 2025-06-17 NOTE — PROGRESS NOTES
[facility]  Department of Family Medicine      Patient:  Leta Bruno 29 y.o. female     Date of Service: 25      Chief complaint:   Chief Complaint   Patient presents with    Hypertension         History ofPresent Illness   The patient is a 29 y.o. female  presented to the clinic with complaints as above.    Has skin lesion on middle finger on left, noticed this a few months ago  -did cryotherapy last visit, resolved     HTN  -f/u  -on labetalol, increased at previous visit however only taking the 100 mg bid   -currently, doing ok on med  -denies any lightheadedness or dizziness       Past Medical History:      Diagnosis Date    Chlamydia     Essential hypertension 2020    Gonorrhea     Herpes     Migraine        PastSurgical History:        Procedure Laterality Date     SECTION N/A 2024     SECTION performed by Nuha Hendrickson DO at [facility]    TONSILLECTOMY         Allergies:    Clindamycin/lincomycin    Social History:   Social History     Socioeconomic History    Marital status: Single     Spouse name: Not on file    Number of children: Not on file    Years of education: Not on file    Highest education level: Not on file   Occupational History    Not on file   Tobacco Use    Smoking status: Former    Smokeless tobacco: Never   Vaping Use    Vaping status: Former   Substance and Sexual Activity    Alcohol use: No     Comment: occ    Drug use: Not Currently     Types: Marijuana (Weed)     Comment: occ    Sexual activity: Yes     Partners: Male   Other Topics Concern    Not on file   Social History Narrative    Not on file     Social Drivers of Health     Financial Resource Strain: Low Risk  (2021)    Overall Financial Resource Strain (CARDIA)     Difficulty of Paying Living Expenses: Not hard at all   Food Insecurity: No Food Insecurity (2025)    Hunger Vital Sign     Worried About Running Out of Food in the Last Year: Never true

## 2025-06-18 ENCOUNTER — OFFICE VISIT (OUTPATIENT)
Dept: PRIMARY CARE CLINIC | Age: 30
End: 2025-06-18
Payer: COMMERCIAL

## 2025-06-18 VITALS
RESPIRATION RATE: 13 BRPM | SYSTOLIC BLOOD PRESSURE: 138 MMHG | HEIGHT: 66 IN | TEMPERATURE: 97.1 F | WEIGHT: 235 LBS | DIASTOLIC BLOOD PRESSURE: 86 MMHG | BODY MASS INDEX: 37.77 KG/M2 | HEART RATE: 86 BPM | OXYGEN SATURATION: 97 %

## 2025-06-18 DIAGNOSIS — I10 ESSENTIAL HYPERTENSION: Primary | ICD-10-CM

## 2025-06-18 PROCEDURE — G2211 COMPLEX E/M VISIT ADD ON: HCPCS | Performed by: STUDENT IN AN ORGANIZED HEALTH CARE EDUCATION/TRAINING PROGRAM

## 2025-06-18 PROCEDURE — 3075F SYST BP GE 130 - 139MM HG: CPT | Performed by: STUDENT IN AN ORGANIZED HEALTH CARE EDUCATION/TRAINING PROGRAM

## 2025-06-18 PROCEDURE — 3079F DIAST BP 80-89 MM HG: CPT | Performed by: STUDENT IN AN ORGANIZED HEALTH CARE EDUCATION/TRAINING PROGRAM

## 2025-06-18 PROCEDURE — 99213 OFFICE O/P EST LOW 20 MIN: CPT | Performed by: STUDENT IN AN ORGANIZED HEALTH CARE EDUCATION/TRAINING PROGRAM

## 2025-06-18 RX ORDER — LABETALOL 100 MG/1
100 TABLET, FILM COATED ORAL 2 TIMES DAILY
Qty: 180 TABLET | Refills: 0 | Status: SHIPPED | OUTPATIENT
Start: 2025-06-18

## (undated) DEVICE — GLOVE SURG SZ 65 THK91MIL LTX FREE SYN POLYISOPRENE

## (undated) DEVICE — SWABSTCK, BENZOIN TINCTURE, 1/PK, STRL: Brand: APLICARE

## (undated) DEVICE — CONTAINER,SPEC,PNEUM TUBE,3OZ,STRL PATH: Brand: MEDLINE

## (undated) DEVICE — VACUETTE® TUBE 6 ML Z SERUM CLOT ACTIVATOR 13X100 RED CAP-BLACK RING, NON-RIDGED: Brand: VACUETTE

## (undated) DEVICE — GLOVE SURG SZ 65 L12IN FNGR THK79MIL GRN LTX FREE

## (undated) DEVICE — CONTAINER SPEC 64OZ POLYPR PATH SNAP LOK CAP W/ LID

## (undated) DEVICE — SUTURE VCRL + SZ 0 L36IN ABSRB VLT L36MM CT-1 1/2 CIR VCP346H

## (undated) DEVICE — 4-PORT MANIFOLD: Brand: NEPTUNE 2

## (undated) DEVICE — 34" SINGLE PATIENT USE HOVERMATT BREATHABLE: Brand: SINGLE PATIENT USE HOVERMATT

## (undated) DEVICE — DOUBLE BASIN SET: Brand: MEDLINE INDUSTRIES, INC.

## (undated) DEVICE — STRIP,CLOSURE,WOUND,MEDI-STRIP,1/2X4: Brand: MEDLINE

## (undated) DEVICE — APPLICATOR PREP 26ML 0.7% IOD POVACRYLEX 74% ISO ALC ST

## (undated) DEVICE — STAPLER SKIN SQ 30 ABSRB STPL DISP INSORB ORDER VIA PHONE OR EMAIL

## (undated) DEVICE — DRESSING FOAM POST OPERATIVE 4X10 IN MEPILEX BORDER AG

## (undated) DEVICE — SYRINGE MED 10ML LUERLOCK TIP W/O SFTY DISP

## (undated) DEVICE — BAG SPECIMEN BIOHAZARD 6IN X 9IN

## (undated) DEVICE — CESAREAN BIRTH PACK: Brand: MEDLINE INDUSTRIES, INC.

## (undated) DEVICE — 1LYRTR 16FR10ML 100%SILI SNAP: Brand: MEDLINE INDUSTRIES, INC.

## (undated) DEVICE — Device: Brand: PORTEX

## (undated) DEVICE — HYPODERMIC SAFETY NEEDLE: Brand: MAGELLAN

## (undated) DEVICE — SUTURE ABSORBABLE MONOFILAMENT 0 TP1 60 IN VIO PDS + PDP991G